# Patient Record
Sex: MALE | Race: BLACK OR AFRICAN AMERICAN | NOT HISPANIC OR LATINO | URBAN - METROPOLITAN AREA
[De-identification: names, ages, dates, MRNs, and addresses within clinical notes are randomized per-mention and may not be internally consistent; named-entity substitution may affect disease eponyms.]

---

## 2021-01-01 ENCOUNTER — INPATIENT (INPATIENT)
Facility: HOSPITAL | Age: 61
LOS: 8 days | End: 2021-10-24
Attending: HOSPITALIST | Admitting: HOSPITALIST
Payer: MEDICAID

## 2021-01-01 VITALS
TEMPERATURE: 99 F | HEART RATE: 120 BPM | RESPIRATION RATE: 16 BRPM | OXYGEN SATURATION: 100 % | DIASTOLIC BLOOD PRESSURE: 153 MMHG | SYSTOLIC BLOOD PRESSURE: 175 MMHG

## 2021-01-01 VITALS
SYSTOLIC BLOOD PRESSURE: 108 MMHG | RESPIRATION RATE: 18 BRPM | HEART RATE: 88 BPM | TEMPERATURE: 98 F | OXYGEN SATURATION: 98 % | DIASTOLIC BLOOD PRESSURE: 57 MMHG

## 2021-01-01 DIAGNOSIS — M79.89 OTHER SPECIFIED SOFT TISSUE DISORDERS: ICD-10-CM

## 2021-01-01 DIAGNOSIS — I10 ESSENTIAL (PRIMARY) HYPERTENSION: ICD-10-CM

## 2021-01-01 DIAGNOSIS — E11.9 TYPE 2 DIABETES MELLITUS WITHOUT COMPLICATIONS: ICD-10-CM

## 2021-01-01 DIAGNOSIS — E87.1 HYPO-OSMOLALITY AND HYPONATREMIA: ICD-10-CM

## 2021-01-01 DIAGNOSIS — Z29.9 ENCOUNTER FOR PROPHYLACTIC MEASURES, UNSPECIFIED: ICD-10-CM

## 2021-01-01 DIAGNOSIS — R41.82 ALTERED MENTAL STATUS, UNSPECIFIED: ICD-10-CM

## 2021-01-01 DIAGNOSIS — M54.9 DORSALGIA, UNSPECIFIED: ICD-10-CM

## 2021-01-01 DIAGNOSIS — F10.10 ALCOHOL ABUSE, UNCOMPLICATED: ICD-10-CM

## 2021-01-01 DIAGNOSIS — R93.89 ABNORMAL FINDINGS ON DIAGNOSTIC IMAGING OF OTHER SPECIFIED BODY STRUCTURES: ICD-10-CM

## 2021-01-01 DIAGNOSIS — D86.9 SARCOIDOSIS, UNSPECIFIED: ICD-10-CM

## 2021-01-01 DIAGNOSIS — F03.90 UNSPECIFIED DEMENTIA, UNSPECIFIED SEVERITY, WITHOUT BEHAVIORAL DISTURBANCE, PSYCHOTIC DISTURBANCE, MOOD DISTURBANCE, AND ANXIETY: ICD-10-CM

## 2021-01-01 DIAGNOSIS — G92.8 OTHER TOXIC ENCEPHALOPATHY: ICD-10-CM

## 2021-01-01 DIAGNOSIS — R50.9 FEVER, UNSPECIFIED: ICD-10-CM

## 2021-01-01 LAB
A1C WITH ESTIMATED AVERAGE GLUCOSE RESULT: 6.3 % — HIGH (ref 4–5.6)
ACE SERPL-CCNC: 28 U/L — SIGNIFICANT CHANGE UP (ref 14–82)
ALBUMIN SERPL ELPH-MCNC: 1.6 G/DL — LOW (ref 3.3–5)
ALBUMIN SERPL ELPH-MCNC: 3.3 G/DL — SIGNIFICANT CHANGE UP (ref 3.3–5)
ALBUMIN SERPL ELPH-MCNC: 3.9 G/DL — SIGNIFICANT CHANGE UP (ref 3.3–5)
ALBUMIN SERPL ELPH-MCNC: 4 G/DL — SIGNIFICANT CHANGE UP (ref 3.3–5)
ALBUMIN SERPL ELPH-MCNC: 4.2 G/DL — SIGNIFICANT CHANGE UP (ref 3.3–5)
ALP SERPL-CCNC: 108 U/L — SIGNIFICANT CHANGE UP (ref 40–120)
ALP SERPL-CCNC: 116 U/L — SIGNIFICANT CHANGE UP (ref 40–120)
ALP SERPL-CCNC: 119 U/L — SIGNIFICANT CHANGE UP (ref 40–120)
ALP SERPL-CCNC: 54 U/L — SIGNIFICANT CHANGE UP (ref 40–120)
ALP SERPL-CCNC: 96 U/L — SIGNIFICANT CHANGE UP (ref 40–120)
ALT FLD-CCNC: 10 U/L — SIGNIFICANT CHANGE UP (ref 4–41)
ALT FLD-CCNC: 11 U/L — SIGNIFICANT CHANGE UP (ref 4–41)
ALT FLD-CCNC: 12 U/L — SIGNIFICANT CHANGE UP (ref 4–41)
ALT FLD-CCNC: 6 U/L — SIGNIFICANT CHANGE UP (ref 4–41)
ALT FLD-CCNC: 9 U/L — SIGNIFICANT CHANGE UP (ref 4–41)
AMPHET UR-MCNC: NEGATIVE — SIGNIFICANT CHANGE UP
ANION GAP SERPL CALC-SCNC: 13 MMOL/L — SIGNIFICANT CHANGE UP (ref 7–14)
ANION GAP SERPL CALC-SCNC: 14 MMOL/L — SIGNIFICANT CHANGE UP (ref 7–14)
ANION GAP SERPL CALC-SCNC: 15 MMOL/L — HIGH (ref 7–14)
ANION GAP SERPL CALC-SCNC: 16 MMOL/L — HIGH (ref 7–14)
ANION GAP SERPL CALC-SCNC: 17 MMOL/L — HIGH (ref 7–14)
ANION GAP SERPL CALC-SCNC: 18 MMOL/L — HIGH (ref 7–14)
ANION GAP SERPL CALC-SCNC: 9 MMOL/L — SIGNIFICANT CHANGE UP (ref 7–14)
APAP SERPL-MCNC: <15 UG/ML — SIGNIFICANT CHANGE UP (ref 15–25)
APPEARANCE CSF: CLEAR — SIGNIFICANT CHANGE UP
APPEARANCE SPUN FLD: ABNORMAL
APPEARANCE UR: CLEAR — SIGNIFICANT CHANGE UP
APPEARANCE UR: CLEAR — SIGNIFICANT CHANGE UP
AST SERPL-CCNC: 17 U/L — SIGNIFICANT CHANGE UP (ref 4–40)
AST SERPL-CCNC: 20 U/L — SIGNIFICANT CHANGE UP (ref 4–40)
AST SERPL-CCNC: 21 U/L — SIGNIFICANT CHANGE UP (ref 4–40)
AST SERPL-CCNC: 22 U/L — SIGNIFICANT CHANGE UP (ref 4–40)
AST SERPL-CCNC: 22 U/L — SIGNIFICANT CHANGE UP (ref 4–40)
B BURGDOR C6 AB SER-ACNC: NEGATIVE — SIGNIFICANT CHANGE UP
B BURGDOR IGG+IGM SER-ACNC: 0.1 INDEX — SIGNIFICANT CHANGE UP (ref 0.01–0.89)
B PERT DNA SPEC QL NAA+PROBE: SIGNIFICANT CHANGE UP
B PERT+PARAPERT DNA PNL SPEC NAA+PROBE: SIGNIFICANT CHANGE UP
BACTERIA # UR AUTO: ABNORMAL
BACTERIA # UR AUTO: NEGATIVE — SIGNIFICANT CHANGE UP
BACTERIAL AG PNL SER: 0 % — SIGNIFICANT CHANGE UP
BARBITURATES UR SCN-MCNC: NEGATIVE — SIGNIFICANT CHANGE UP
BASE EXCESS BLDV CALC-SCNC: 1.4 MMOL/L — SIGNIFICANT CHANGE UP (ref -2–3)
BASOPHILS # BLD AUTO: 0 K/UL — SIGNIFICANT CHANGE UP (ref 0–0.2)
BASOPHILS # BLD AUTO: 0 K/UL — SIGNIFICANT CHANGE UP (ref 0–0.2)
BASOPHILS # BLD AUTO: 0.01 K/UL — SIGNIFICANT CHANGE UP (ref 0–0.2)
BASOPHILS # BLD AUTO: 0.03 K/UL — SIGNIFICANT CHANGE UP (ref 0–0.2)
BASOPHILS NFR BLD AUTO: 0 % — SIGNIFICANT CHANGE UP (ref 0–2)
BASOPHILS NFR BLD AUTO: 0 % — SIGNIFICANT CHANGE UP (ref 0–2)
BASOPHILS NFR BLD AUTO: 0.2 % — SIGNIFICANT CHANGE UP (ref 0–2)
BASOPHILS NFR BLD AUTO: 0.6 % — SIGNIFICANT CHANGE UP (ref 0–2)
BENZODIAZ UR-MCNC: NEGATIVE — SIGNIFICANT CHANGE UP
BILIRUB DIRECT SERPL-MCNC: <0.2 MG/DL — SIGNIFICANT CHANGE UP (ref 0–0.2)
BILIRUB INDIRECT FLD-MCNC: >0 MG/DL — SIGNIFICANT CHANGE UP (ref 0–1)
BILIRUB SERPL-MCNC: 0.2 MG/DL — SIGNIFICANT CHANGE UP (ref 0.2–1.2)
BILIRUB SERPL-MCNC: 0.6 MG/DL — SIGNIFICANT CHANGE UP (ref 0.2–1.2)
BILIRUB SERPL-MCNC: 0.7 MG/DL — SIGNIFICANT CHANGE UP (ref 0.2–1.2)
BILIRUB UR-MCNC: NEGATIVE — SIGNIFICANT CHANGE UP
BILIRUB UR-MCNC: NEGATIVE — SIGNIFICANT CHANGE UP
BLOOD GAS VENOUS COMPREHENSIVE RESULT: SIGNIFICANT CHANGE UP
BORDETELLA PARAPERTUSSIS (RAPRVP): SIGNIFICANT CHANGE UP
BUN SERPL-MCNC: 12 MG/DL — SIGNIFICANT CHANGE UP (ref 7–23)
BUN SERPL-MCNC: 13 MG/DL — SIGNIFICANT CHANGE UP (ref 7–23)
BUN SERPL-MCNC: 14 MG/DL — SIGNIFICANT CHANGE UP (ref 7–23)
BUN SERPL-MCNC: 16 MG/DL — SIGNIFICANT CHANGE UP (ref 7–23)
BUN SERPL-MCNC: 16 MG/DL — SIGNIFICANT CHANGE UP (ref 7–23)
BUN SERPL-MCNC: 18 MG/DL — SIGNIFICANT CHANGE UP (ref 7–23)
BUN SERPL-MCNC: 18 MG/DL — SIGNIFICANT CHANGE UP (ref 7–23)
BUN SERPL-MCNC: 19 MG/DL — SIGNIFICANT CHANGE UP (ref 7–23)
BUN SERPL-MCNC: 20 MG/DL — SIGNIFICANT CHANGE UP (ref 7–23)
BUN SERPL-MCNC: 21 MG/DL — SIGNIFICANT CHANGE UP (ref 7–23)
BUN SERPL-MCNC: 22 MG/DL — SIGNIFICANT CHANGE UP (ref 7–23)
BUN SERPL-MCNC: 22 MG/DL — SIGNIFICANT CHANGE UP (ref 7–23)
BUN SERPL-MCNC: 27 MG/DL — HIGH (ref 7–23)
BUN SERPL-MCNC: 29 MG/DL — HIGH (ref 7–23)
BUN SERPL-MCNC: 33 MG/DL — HIGH (ref 7–23)
C PNEUM DNA SPEC QL NAA+PROBE: SIGNIFICANT CHANGE UP
CALCIUM SERPL-MCNC: 10 MG/DL — SIGNIFICANT CHANGE UP (ref 8.4–10.5)
CALCIUM SERPL-MCNC: 4.9 MG/DL — CRITICAL LOW (ref 8.4–10.5)
CALCIUM SERPL-MCNC: 8.6 MG/DL — SIGNIFICANT CHANGE UP (ref 8.4–10.5)
CALCIUM SERPL-MCNC: 8.7 MG/DL — SIGNIFICANT CHANGE UP (ref 8.4–10.5)
CALCIUM SERPL-MCNC: 8.7 MG/DL — SIGNIFICANT CHANGE UP (ref 8.4–10.5)
CALCIUM SERPL-MCNC: 8.8 MG/DL — SIGNIFICANT CHANGE UP (ref 8.4–10.5)
CALCIUM SERPL-MCNC: 8.9 MG/DL — SIGNIFICANT CHANGE UP (ref 8.4–10.5)
CALCIUM SERPL-MCNC: 9.1 MG/DL — SIGNIFICANT CHANGE UP (ref 8.4–10.5)
CALCIUM SERPL-MCNC: 9.2 MG/DL — SIGNIFICANT CHANGE UP (ref 8.4–10.5)
CALCIUM SERPL-MCNC: 9.3 MG/DL — SIGNIFICANT CHANGE UP (ref 8.4–10.5)
CALCIUM SERPL-MCNC: 9.4 MG/DL — SIGNIFICANT CHANGE UP (ref 8.4–10.5)
CALCIUM SERPL-MCNC: 9.6 MG/DL — SIGNIFICANT CHANGE UP (ref 8.4–10.5)
CALCIUM SERPL-MCNC: 9.6 MG/DL — SIGNIFICANT CHANGE UP (ref 8.4–10.5)
CALCIUM SERPL-MCNC: 9.8 MG/DL — SIGNIFICANT CHANGE UP (ref 8.4–10.5)
CHLORIDE BLDV-SCNC: 95 MMOL/L — LOW (ref 96–108)
CHLORIDE SERPL-SCNC: 115 MMOL/L — HIGH (ref 98–107)
CHLORIDE SERPL-SCNC: 81 MMOL/L — LOW (ref 98–107)
CHLORIDE SERPL-SCNC: 82 MMOL/L — LOW (ref 98–107)
CHLORIDE SERPL-SCNC: 84 MMOL/L — LOW (ref 98–107)
CHLORIDE SERPL-SCNC: 86 MMOL/L — LOW (ref 98–107)
CHLORIDE SERPL-SCNC: 88 MMOL/L — LOW (ref 98–107)
CHLORIDE SERPL-SCNC: 88 MMOL/L — LOW (ref 98–107)
CHLORIDE SERPL-SCNC: 89 MMOL/L — LOW (ref 98–107)
CHLORIDE SERPL-SCNC: 90 MMOL/L — LOW (ref 98–107)
CHLORIDE SERPL-SCNC: 94 MMOL/L — LOW (ref 98–107)
CHLORIDE UR-SCNC: 144 MMOL/L — SIGNIFICANT CHANGE UP
CHLORIDE UR-SCNC: 79 MMOL/L — SIGNIFICANT CHANGE UP
CHLORIDE UR-SCNC: 87 MMOL/L — SIGNIFICANT CHANGE UP
CK SERPL-CCNC: 104 U/L — SIGNIFICANT CHANGE UP (ref 30–200)
CO2 BLDV-SCNC: 27.9 MMOL/L — HIGH (ref 22–26)
CO2 SERPL-SCNC: 11 MMOL/L — LOW (ref 22–31)
CO2 SERPL-SCNC: 15 MMOL/L — LOW (ref 22–31)
CO2 SERPL-SCNC: 21 MMOL/L — LOW (ref 22–31)
CO2 SERPL-SCNC: 22 MMOL/L — SIGNIFICANT CHANGE UP (ref 22–31)
CO2 SERPL-SCNC: 23 MMOL/L — SIGNIFICANT CHANGE UP (ref 22–31)
CO2 SERPL-SCNC: 24 MMOL/L — SIGNIFICANT CHANGE UP (ref 22–31)
CO2 SERPL-SCNC: 24 MMOL/L — SIGNIFICANT CHANGE UP (ref 22–31)
CO2 SERPL-SCNC: 25 MMOL/L — SIGNIFICANT CHANGE UP (ref 22–31)
CO2 SERPL-SCNC: 26 MMOL/L — SIGNIFICANT CHANGE UP (ref 22–31)
CO2 SERPL-SCNC: 27 MMOL/L — SIGNIFICANT CHANGE UP (ref 22–31)
CO2 SERPL-SCNC: 27 MMOL/L — SIGNIFICANT CHANGE UP (ref 22–31)
COCAINE METAB.OTHER UR-MCNC: NEGATIVE — SIGNIFICANT CHANGE UP
COD CRY URNS QL: ABNORMAL
COLOR CSF: YELLOW
COLOR SPEC: YELLOW — SIGNIFICANT CHANGE UP
COLOR SPEC: YELLOW — SIGNIFICANT CHANGE UP
COVID-19 SPIKE DOMAIN AB INTERP: NEGATIVE — SIGNIFICANT CHANGE UP
COVID-19 SPIKE DOMAIN ANTIBODY RESULT: 0.4 U/ML — SIGNIFICANT CHANGE UP
CREAT SERPL-MCNC: 0.38 MG/DL — LOW (ref 0.5–1.3)
CREAT SERPL-MCNC: 0.57 MG/DL — SIGNIFICANT CHANGE UP (ref 0.5–1.3)
CREAT SERPL-MCNC: 0.59 MG/DL — SIGNIFICANT CHANGE UP (ref 0.5–1.3)
CREAT SERPL-MCNC: 0.63 MG/DL — SIGNIFICANT CHANGE UP (ref 0.5–1.3)
CREAT SERPL-MCNC: 0.63 MG/DL — SIGNIFICANT CHANGE UP (ref 0.5–1.3)
CREAT SERPL-MCNC: 0.66 MG/DL — SIGNIFICANT CHANGE UP (ref 0.5–1.3)
CREAT SERPL-MCNC: 0.68 MG/DL — SIGNIFICANT CHANGE UP (ref 0.5–1.3)
CREAT SERPL-MCNC: 0.68 MG/DL — SIGNIFICANT CHANGE UP (ref 0.5–1.3)
CREAT SERPL-MCNC: 0.7 MG/DL — SIGNIFICANT CHANGE UP (ref 0.5–1.3)
CREAT SERPL-MCNC: 0.72 MG/DL — SIGNIFICANT CHANGE UP (ref 0.5–1.3)
CREAT SERPL-MCNC: 0.74 MG/DL — SIGNIFICANT CHANGE UP (ref 0.5–1.3)
CREAT SERPL-MCNC: 0.76 MG/DL — SIGNIFICANT CHANGE UP (ref 0.5–1.3)
CREAT SERPL-MCNC: 0.77 MG/DL — SIGNIFICANT CHANGE UP (ref 0.5–1.3)
CREAT SERPL-MCNC: 0.8 MG/DL — SIGNIFICANT CHANGE UP (ref 0.5–1.3)
CREAT SERPL-MCNC: 0.86 MG/DL — SIGNIFICANT CHANGE UP (ref 0.5–1.3)
CREAT SERPL-MCNC: 1.2 MG/DL — SIGNIFICANT CHANGE UP (ref 0.5–1.3)
CREATININE URINE RESULT, DAU: 314 MG/DL — SIGNIFICANT CHANGE UP
CRP SERPL-MCNC: 207.4 MG/L — HIGH
CRYPTOC AG CSF-ACNC: NEGATIVE — SIGNIFICANT CHANGE UP
CSF PCR RESULT: SIGNIFICANT CHANGE UP
CULTURE RESULTS: NO GROWTH — SIGNIFICANT CHANGE UP
CULTURE RESULTS: SIGNIFICANT CHANGE UP
DIFF PNL FLD: ABNORMAL
DIFF PNL FLD: ABNORMAL
EOSINOPHIL # BLD AUTO: 0 K/UL — SIGNIFICANT CHANGE UP (ref 0–0.5)
EOSINOPHIL # CSF: 0 % — SIGNIFICANT CHANGE UP
EOSINOPHIL NFR BLD AUTO: 0 % — SIGNIFICANT CHANGE UP (ref 0–6)
EPI CELLS # UR: 1 /HPF — SIGNIFICANT CHANGE UP (ref 0–5)
EPI CELLS # UR: 1 /HPF — SIGNIFICANT CHANGE UP (ref 0–5)
ERYTHROCYTE [SEDIMENTATION RATE] IN BLOOD: 53 MM/HR — HIGH (ref 1–15)
ESTIMATED AVERAGE GLUCOSE: 134 — SIGNIFICANT CHANGE UP
ETHANOL SERPL-MCNC: <10 MG/DL — SIGNIFICANT CHANGE UP
EV RNA SPEC QL NAA+PROBE: NEGATIVE — SIGNIFICANT CHANGE UP
FLUAV SUBTYP SPEC NAA+PROBE: SIGNIFICANT CHANGE UP
FLUBV RNA SPEC QL NAA+PROBE: SIGNIFICANT CHANGE UP
FOLATE SERPL-MCNC: 5.6 NG/ML — SIGNIFICANT CHANGE UP (ref 3.1–17.5)
FUNGITELL: <31 PG/ML — SIGNIFICANT CHANGE UP
GAS PNL BLDV: 126 MMOL/L — LOW (ref 136–145)
GLUCOSE BLDC GLUCOMTR-MCNC: 100 MG/DL — HIGH (ref 70–99)
GLUCOSE BLDC GLUCOMTR-MCNC: 101 MG/DL — HIGH (ref 70–99)
GLUCOSE BLDC GLUCOMTR-MCNC: 102 MG/DL — HIGH (ref 70–99)
GLUCOSE BLDC GLUCOMTR-MCNC: 102 MG/DL — HIGH (ref 70–99)
GLUCOSE BLDC GLUCOMTR-MCNC: 103 MG/DL — HIGH (ref 70–99)
GLUCOSE BLDC GLUCOMTR-MCNC: 103 MG/DL — HIGH (ref 70–99)
GLUCOSE BLDC GLUCOMTR-MCNC: 104 MG/DL — HIGH (ref 70–99)
GLUCOSE BLDC GLUCOMTR-MCNC: 105 MG/DL — HIGH (ref 70–99)
GLUCOSE BLDC GLUCOMTR-MCNC: 105 MG/DL — HIGH (ref 70–99)
GLUCOSE BLDC GLUCOMTR-MCNC: 109 MG/DL — HIGH (ref 70–99)
GLUCOSE BLDC GLUCOMTR-MCNC: 114 MG/DL — HIGH (ref 70–99)
GLUCOSE BLDC GLUCOMTR-MCNC: 115 MG/DL — HIGH (ref 70–99)
GLUCOSE BLDC GLUCOMTR-MCNC: 116 MG/DL — HIGH (ref 70–99)
GLUCOSE BLDC GLUCOMTR-MCNC: 118 MG/DL — HIGH (ref 70–99)
GLUCOSE BLDC GLUCOMTR-MCNC: 120 MG/DL — HIGH (ref 70–99)
GLUCOSE BLDC GLUCOMTR-MCNC: 121 MG/DL — HIGH (ref 70–99)
GLUCOSE BLDC GLUCOMTR-MCNC: 122 MG/DL — HIGH (ref 70–99)
GLUCOSE BLDC GLUCOMTR-MCNC: 122 MG/DL — HIGH (ref 70–99)
GLUCOSE BLDC GLUCOMTR-MCNC: 124 MG/DL — HIGH (ref 70–99)
GLUCOSE BLDC GLUCOMTR-MCNC: 126 MG/DL — HIGH (ref 70–99)
GLUCOSE BLDC GLUCOMTR-MCNC: 126 MG/DL — HIGH (ref 70–99)
GLUCOSE BLDC GLUCOMTR-MCNC: 131 MG/DL — HIGH (ref 70–99)
GLUCOSE BLDC GLUCOMTR-MCNC: 132 MG/DL — HIGH (ref 70–99)
GLUCOSE BLDC GLUCOMTR-MCNC: 133 MG/DL — HIGH (ref 70–99)
GLUCOSE BLDC GLUCOMTR-MCNC: 134 MG/DL — HIGH (ref 70–99)
GLUCOSE BLDC GLUCOMTR-MCNC: 145 MG/DL — HIGH (ref 70–99)
GLUCOSE BLDC GLUCOMTR-MCNC: 43 MG/DL — CRITICAL LOW (ref 70–99)
GLUCOSE BLDC GLUCOMTR-MCNC: 84 MG/DL — SIGNIFICANT CHANGE UP (ref 70–99)
GLUCOSE BLDC GLUCOMTR-MCNC: 89 MG/DL — SIGNIFICANT CHANGE UP (ref 70–99)
GLUCOSE BLDC GLUCOMTR-MCNC: 91 MG/DL — SIGNIFICANT CHANGE UP (ref 70–99)
GLUCOSE BLDC GLUCOMTR-MCNC: 92 MG/DL — SIGNIFICANT CHANGE UP (ref 70–99)
GLUCOSE BLDC GLUCOMTR-MCNC: 93 MG/DL — SIGNIFICANT CHANGE UP (ref 70–99)
GLUCOSE BLDC GLUCOMTR-MCNC: 96 MG/DL — SIGNIFICANT CHANGE UP (ref 70–99)
GLUCOSE BLDC GLUCOMTR-MCNC: 99 MG/DL — SIGNIFICANT CHANGE UP (ref 70–99)
GLUCOSE BLDV-MCNC: 119 MG/DL — HIGH (ref 70–99)
GLUCOSE CSF-MCNC: 14 MG/DL — LOW (ref 40–70)
GLUCOSE SERPL-MCNC: 102 MG/DL — HIGH (ref 70–99)
GLUCOSE SERPL-MCNC: 103 MG/DL — HIGH (ref 70–99)
GLUCOSE SERPL-MCNC: 103 MG/DL — HIGH (ref 70–99)
GLUCOSE SERPL-MCNC: 105 MG/DL — HIGH (ref 70–99)
GLUCOSE SERPL-MCNC: 106 MG/DL — HIGH (ref 70–99)
GLUCOSE SERPL-MCNC: 110 MG/DL — HIGH (ref 70–99)
GLUCOSE SERPL-MCNC: 119 MG/DL — HIGH (ref 70–99)
GLUCOSE SERPL-MCNC: 120 MG/DL — HIGH (ref 70–99)
GLUCOSE SERPL-MCNC: 124 MG/DL — HIGH (ref 70–99)
GLUCOSE SERPL-MCNC: 68 MG/DL — LOW (ref 70–99)
GLUCOSE SERPL-MCNC: 92 MG/DL — SIGNIFICANT CHANGE UP (ref 70–99)
GLUCOSE SERPL-MCNC: 93 MG/DL — SIGNIFICANT CHANGE UP (ref 70–99)
GLUCOSE SERPL-MCNC: 94 MG/DL — SIGNIFICANT CHANGE UP (ref 70–99)
GLUCOSE SERPL-MCNC: 96 MG/DL — SIGNIFICANT CHANGE UP (ref 70–99)
GLUCOSE SERPL-MCNC: 96 MG/DL — SIGNIFICANT CHANGE UP (ref 70–99)
GLUCOSE SERPL-MCNC: 97 MG/DL — SIGNIFICANT CHANGE UP (ref 70–99)
GLUCOSE UR QL: NEGATIVE — SIGNIFICANT CHANGE UP
GLUCOSE UR QL: NEGATIVE — SIGNIFICANT CHANGE UP
GRAM STN FLD: SIGNIFICANT CHANGE UP
H CAPSUL AG SPEC-ACNC: SIGNIFICANT CHANGE UP
H CAPSUL AG UR IA-ACNC: SIGNIFICANT CHANGE UP NG/ML
H CAPSUL AG UR QL IA: SIGNIFICANT CHANGE UP
HADV DNA SPEC QL NAA+PROBE: SIGNIFICANT CHANGE UP
HCO3 BLDV-SCNC: 27 MMOL/L — SIGNIFICANT CHANGE UP (ref 22–29)
HCOV 229E RNA SPEC QL NAA+PROBE: SIGNIFICANT CHANGE UP
HCOV HKU1 RNA SPEC QL NAA+PROBE: SIGNIFICANT CHANGE UP
HCOV NL63 RNA SPEC QL NAA+PROBE: SIGNIFICANT CHANGE UP
HCOV OC43 RNA SPEC QL NAA+PROBE: SIGNIFICANT CHANGE UP
HCT VFR BLD CALC: 39.4 % — SIGNIFICANT CHANGE UP (ref 39–50)
HCT VFR BLD CALC: 40 % — SIGNIFICANT CHANGE UP (ref 39–50)
HCT VFR BLD CALC: 40.1 % — SIGNIFICANT CHANGE UP (ref 39–50)
HCT VFR BLD CALC: 43.1 % — SIGNIFICANT CHANGE UP (ref 39–50)
HCT VFR BLD CALC: 43.6 % — SIGNIFICANT CHANGE UP (ref 39–50)
HCT VFR BLD CALC: 44.9 % — SIGNIFICANT CHANGE UP (ref 39–50)
HCT VFR BLD CALC: 46.1 % — SIGNIFICANT CHANGE UP (ref 39–50)
HCT VFR BLD CALC: 47.9 % — SIGNIFICANT CHANGE UP (ref 39–50)
HCT VFR BLD CALC: 49.6 % — SIGNIFICANT CHANGE UP (ref 39–50)
HCT VFR BLDA CALC: 45 % — SIGNIFICANT CHANGE UP (ref 39–51)
HCV AB S/CO SERPL IA: 0.3 S/CO — SIGNIFICANT CHANGE UP (ref 0–0.99)
HCV AB SERPL-IMP: SIGNIFICANT CHANGE UP
HGB BLD CALC-MCNC: 15.1 G/DL — SIGNIFICANT CHANGE UP (ref 13–17)
HGB BLD-MCNC: 13.7 G/DL — SIGNIFICANT CHANGE UP (ref 13–17)
HGB BLD-MCNC: 14 G/DL — SIGNIFICANT CHANGE UP (ref 13–17)
HGB BLD-MCNC: 14.1 G/DL — SIGNIFICANT CHANGE UP (ref 13–17)
HGB BLD-MCNC: 14.5 G/DL — SIGNIFICANT CHANGE UP (ref 13–17)
HGB BLD-MCNC: 15.2 G/DL — SIGNIFICANT CHANGE UP (ref 13–17)
HGB BLD-MCNC: 15.7 G/DL — SIGNIFICANT CHANGE UP (ref 13–17)
HGB BLD-MCNC: 16.1 G/DL — SIGNIFICANT CHANGE UP (ref 13–17)
HGB BLD-MCNC: 16.2 G/DL — SIGNIFICANT CHANGE UP (ref 13–17)
HGB BLD-MCNC: 17 G/DL — SIGNIFICANT CHANGE UP (ref 13–17)
HIV 1+2 AB+HIV1 P24 AG SERPL QL IA: SIGNIFICANT CHANGE UP
HMPV RNA SPEC QL NAA+PROBE: SIGNIFICANT CHANGE UP
HPIV1 RNA SPEC QL NAA+PROBE: SIGNIFICANT CHANGE UP
HPIV2 RNA SPEC QL NAA+PROBE: SIGNIFICANT CHANGE UP
HPIV3 RNA SPEC QL NAA+PROBE: SIGNIFICANT CHANGE UP
HPIV4 RNA SPEC QL NAA+PROBE: SIGNIFICANT CHANGE UP
HSV1 IGG SER-ACNC: 44.7 INDEX — HIGH
HSV1 IGG SERPL QL IA: POSITIVE
HSV2 IGG FLD-ACNC: 0.74 INDEX — SIGNIFICANT CHANGE UP
HSV2 IGG SERPL QL IA: NEGATIVE — SIGNIFICANT CHANGE UP
HYALINE CASTS # UR AUTO: 0 /LPF — SIGNIFICANT CHANGE UP (ref 0–7)
HYALINE CASTS # UR AUTO: SIGNIFICANT CHANGE UP /LPF (ref 0–7)
IANC: 3.39 K/UL — SIGNIFICANT CHANGE UP (ref 1.5–8.5)
IANC: 3.99 K/UL — SIGNIFICANT CHANGE UP (ref 1.5–8.5)
IANC: 4.03 K/UL — SIGNIFICANT CHANGE UP (ref 1.5–8.5)
IANC: 4.5 K/UL — SIGNIFICANT CHANGE UP (ref 1.5–8.5)
IANC: 4.92 K/UL — SIGNIFICANT CHANGE UP (ref 1.5–8.5)
IANC: 5.09 K/UL — SIGNIFICANT CHANGE UP (ref 1.5–8.5)
IMM GRANULOCYTES NFR BLD AUTO: 0.2 % — SIGNIFICANT CHANGE UP (ref 0–1.5)
IMM GRANULOCYTES NFR BLD AUTO: 0.4 % — SIGNIFICANT CHANGE UP (ref 0–1.5)
IMM GRANULOCYTES NFR BLD AUTO: 0.5 % — SIGNIFICANT CHANGE UP (ref 0–1.5)
INR BLD: 1.13 RATIO — SIGNIFICANT CHANGE UP (ref 0.88–1.16)
INR BLD: 1.28 RATIO — HIGH (ref 0.88–1.16)
KETONES UR-MCNC: ABNORMAL
KETONES UR-MCNC: ABNORMAL
LABORATORY COMMENT REPORT: SIGNIFICANT CHANGE UP
LACTATE BLDV-MCNC: 2.4 MMOL/L — HIGH (ref 0.5–2)
LDH CSF L TO P-CCNC: 576 U/L — SIGNIFICANT CHANGE UP
LDH FLD-CCNC: 576 U/L — SIGNIFICANT CHANGE UP
LEUKOCYTE ESTERASE UR-ACNC: NEGATIVE — SIGNIFICANT CHANGE UP
LEUKOCYTE ESTERASE UR-ACNC: NEGATIVE — SIGNIFICANT CHANGE UP
LYMPHOCYTES # BLD AUTO: 0.17 K/UL — LOW (ref 1–3.3)
LYMPHOCYTES # BLD AUTO: 0.19 K/UL — LOW (ref 1–3.3)
LYMPHOCYTES # BLD AUTO: 0.25 K/UL — LOW (ref 1–3.3)
LYMPHOCYTES # BLD AUTO: 0.27 K/UL — LOW (ref 1–3.3)
LYMPHOCYTES # BLD AUTO: 0.29 K/UL — LOW (ref 1–3.3)
LYMPHOCYTES # BLD AUTO: 0.4 K/UL — LOW (ref 1–3.3)
LYMPHOCYTES # BLD AUTO: 3 % — LOW (ref 13–44)
LYMPHOCYTES # BLD AUTO: 4.5 % — LOW (ref 13–44)
LYMPHOCYTES # BLD AUTO: 4.7 % — LOW (ref 13–44)
LYMPHOCYTES # BLD AUTO: 4.8 % — LOW (ref 13–44)
LYMPHOCYTES # BLD AUTO: 6 % — LOW (ref 13–44)
LYMPHOCYTES # BLD AUTO: 7.6 % — LOW (ref 13–44)
LYMPHOCYTES # CSF: 79 % — SIGNIFICANT CHANGE UP
M PNEUMO DNA SPEC QL NAA+PROBE: SIGNIFICANT CHANGE UP
MAGNESIUM SERPL-MCNC: 1.2 MG/DL — LOW (ref 1.6–2.6)
MAGNESIUM SERPL-MCNC: 1.7 MG/DL — SIGNIFICANT CHANGE UP (ref 1.6–2.6)
MAGNESIUM SERPL-MCNC: 1.8 MG/DL — SIGNIFICANT CHANGE UP (ref 1.6–2.6)
MAGNESIUM SERPL-MCNC: 1.8 MG/DL — SIGNIFICANT CHANGE UP (ref 1.6–2.6)
MAGNESIUM SERPL-MCNC: 1.9 MG/DL — SIGNIFICANT CHANGE UP (ref 1.6–2.6)
MAGNESIUM SERPL-MCNC: 1.9 MG/DL — SIGNIFICANT CHANGE UP (ref 1.6–2.6)
MAGNESIUM SERPL-MCNC: 2 MG/DL — SIGNIFICANT CHANGE UP (ref 1.6–2.6)
MAGNESIUM SERPL-MCNC: 2.1 MG/DL — SIGNIFICANT CHANGE UP (ref 1.6–2.6)
MCHC RBC-ENTMCNC: 26.4 PG — LOW (ref 27–34)
MCHC RBC-ENTMCNC: 26.6 PG — LOW (ref 27–34)
MCHC RBC-ENTMCNC: 26.7 PG — LOW (ref 27–34)
MCHC RBC-ENTMCNC: 27 PG — SIGNIFICANT CHANGE UP (ref 27–34)
MCHC RBC-ENTMCNC: 27.1 PG — SIGNIFICANT CHANGE UP (ref 27–34)
MCHC RBC-ENTMCNC: 27.2 PG — SIGNIFICANT CHANGE UP (ref 27–34)
MCHC RBC-ENTMCNC: 27.3 PG — SIGNIFICANT CHANGE UP (ref 27–34)
MCHC RBC-ENTMCNC: 33.6 GM/DL — SIGNIFICANT CHANGE UP (ref 32–36)
MCHC RBC-ENTMCNC: 33.8 GM/DL — SIGNIFICANT CHANGE UP (ref 32–36)
MCHC RBC-ENTMCNC: 34.3 GM/DL — SIGNIFICANT CHANGE UP (ref 32–36)
MCHC RBC-ENTMCNC: 34.8 GM/DL — SIGNIFICANT CHANGE UP (ref 32–36)
MCHC RBC-ENTMCNC: 34.9 GM/DL — SIGNIFICANT CHANGE UP (ref 32–36)
MCHC RBC-ENTMCNC: 34.9 GM/DL — SIGNIFICANT CHANGE UP (ref 32–36)
MCHC RBC-ENTMCNC: 35 GM/DL — SIGNIFICANT CHANGE UP (ref 32–36)
MCHC RBC-ENTMCNC: 35 GM/DL — SIGNIFICANT CHANGE UP (ref 32–36)
MCHC RBC-ENTMCNC: 35.2 GM/DL — SIGNIFICANT CHANGE UP (ref 32–36)
MCV RBC AUTO: 76.1 FL — LOW (ref 80–100)
MCV RBC AUTO: 76.2 FL — LOW (ref 80–100)
MCV RBC AUTO: 76.8 FL — LOW (ref 80–100)
MCV RBC AUTO: 76.8 FL — LOW (ref 80–100)
MCV RBC AUTO: 77.7 FL — LOW (ref 80–100)
MCV RBC AUTO: 77.9 FL — LOW (ref 80–100)
MCV RBC AUTO: 78 FL — LOW (ref 80–100)
MCV RBC AUTO: 78.4 FL — LOW (ref 80–100)
MCV RBC AUTO: 80.8 FL — SIGNIFICANT CHANGE UP (ref 80–100)
METHADONE UR-MCNC: NEGATIVE — SIGNIFICANT CHANGE UP
MONOCYTES # BLD AUTO: 0.33 K/UL — SIGNIFICANT CHANGE UP (ref 0–0.9)
MONOCYTES # BLD AUTO: 0.43 K/UL — SIGNIFICANT CHANGE UP (ref 0–0.9)
MONOCYTES # BLD AUTO: 0.45 K/UL — SIGNIFICANT CHANGE UP (ref 0–0.9)
MONOCYTES # BLD AUTO: 0.5 K/UL — SIGNIFICANT CHANGE UP (ref 0–0.9)
MONOCYTES # BLD AUTO: 0.53 K/UL — SIGNIFICANT CHANGE UP (ref 0–0.9)
MONOCYTES # BLD AUTO: 0.82 K/UL — SIGNIFICANT CHANGE UP (ref 0–0.9)
MONOCYTES NFR BLD AUTO: 10 % — SIGNIFICANT CHANGE UP (ref 2–14)
MONOCYTES NFR BLD AUTO: 10.3 % — SIGNIFICANT CHANGE UP (ref 2–14)
MONOCYTES NFR BLD AUTO: 15.6 % — HIGH (ref 2–14)
MONOCYTES NFR BLD AUTO: 7.5 % — SIGNIFICANT CHANGE UP (ref 2–14)
MONOCYTES NFR BLD AUTO: 8 % — SIGNIFICANT CHANGE UP (ref 2–14)
MONOCYTES NFR BLD AUTO: 8 % — SIGNIFICANT CHANGE UP (ref 2–14)
MONOS+MACROS NFR CSF: 10 % — SIGNIFICANT CHANGE UP
NEUTROPHILS # BLD AUTO: 3.4 K/UL — SIGNIFICANT CHANGE UP (ref 1.8–7.4)
NEUTROPHILS # BLD AUTO: 3.99 K/UL — SIGNIFICANT CHANGE UP (ref 1.8–7.4)
NEUTROPHILS # BLD AUTO: 4.03 K/UL — SIGNIFICANT CHANGE UP (ref 1.8–7.4)
NEUTROPHILS # BLD AUTO: 4.5 K/UL — SIGNIFICANT CHANGE UP (ref 1.8–7.4)
NEUTROPHILS # BLD AUTO: 4.92 K/UL — SIGNIFICANT CHANGE UP (ref 1.8–7.4)
NEUTROPHILS # BLD AUTO: 5.09 K/UL — SIGNIFICANT CHANGE UP (ref 1.8–7.4)
NEUTROPHILS # CSF: 11 % — SIGNIFICANT CHANGE UP
NEUTROPHILS NFR BLD AUTO: 75.8 % — SIGNIFICANT CHANGE UP (ref 43–77)
NEUTROPHILS NFR BLD AUTO: 79.5 % — HIGH (ref 43–77)
NEUTROPHILS NFR BLD AUTO: 83.1 % — HIGH (ref 43–77)
NEUTROPHILS NFR BLD AUTO: 84.9 % — HIGH (ref 43–77)
NEUTROPHILS NFR BLD AUTO: 86.8 % — HIGH (ref 43–77)
NEUTROPHILS NFR BLD AUTO: 88.8 % — HIGH (ref 43–77)
NIGHT BLUE STAIN TISS: SIGNIFICANT CHANGE UP
NITRITE UR-MCNC: NEGATIVE — SIGNIFICANT CHANGE UP
NITRITE UR-MCNC: NEGATIVE — SIGNIFICANT CHANGE UP
NRBC # BLD: 0 /100 WBCS — SIGNIFICANT CHANGE UP
NRBC # FLD: 0 K/UL — SIGNIFICANT CHANGE UP
NRBC NFR CSF: 31 CELLS/UL — HIGH (ref 0–5)
OPIATES UR-MCNC: NEGATIVE — SIGNIFICANT CHANGE UP
OSMOLALITY SERPL: 260 MOSMOL/KG — LOW (ref 280–301)
OSMOLALITY UR: 757 MOSM/KG — SIGNIFICANT CHANGE UP (ref 50–1200)
OSMOLALITY UR: 805 MOSM/KG — SIGNIFICANT CHANGE UP (ref 50–1200)
OTHER CELLS CSF MANUAL: 0 % — SIGNIFICANT CHANGE UP
OXYCODONE UR-MCNC: NEGATIVE — SIGNIFICANT CHANGE UP
PCO2 BLDV: 43 MMHG — SIGNIFICANT CHANGE UP (ref 42–55)
PCP SPEC-MCNC: SIGNIFICANT CHANGE UP
PCP UR-MCNC: NEGATIVE — SIGNIFICANT CHANGE UP
PH BLDV: 7.4 — SIGNIFICANT CHANGE UP (ref 7.32–7.43)
PH UR: 6 — SIGNIFICANT CHANGE UP (ref 5–8)
PH UR: 6.5 — SIGNIFICANT CHANGE UP (ref 5–8)
PHOSPHATE SERPL-MCNC: 1.7 MG/DL — LOW (ref 2.5–4.5)
PHOSPHATE SERPL-MCNC: 1.7 MG/DL — LOW (ref 2.5–4.5)
PHOSPHATE SERPL-MCNC: 2.1 MG/DL — LOW (ref 2.5–4.5)
PHOSPHATE SERPL-MCNC: 2.3 MG/DL — LOW (ref 2.5–4.5)
PHOSPHATE SERPL-MCNC: 2.3 MG/DL — LOW (ref 2.5–4.5)
PHOSPHATE SERPL-MCNC: 2.4 MG/DL — LOW (ref 2.5–4.5)
PHOSPHATE SERPL-MCNC: 2.4 MG/DL — LOW (ref 2.5–4.5)
PHOSPHATE SERPL-MCNC: 2.6 MG/DL — SIGNIFICANT CHANGE UP (ref 2.5–4.5)
PHOSPHATE SERPL-MCNC: 2.7 MG/DL — SIGNIFICANT CHANGE UP (ref 2.5–4.5)
PHOSPHATE SERPL-MCNC: 2.9 MG/DL — SIGNIFICANT CHANGE UP (ref 2.5–4.5)
PHOSPHATE SERPL-MCNC: 2.9 MG/DL — SIGNIFICANT CHANGE UP (ref 2.5–4.5)
PLATELET # BLD AUTO: 253 K/UL — SIGNIFICANT CHANGE UP (ref 150–400)
PLATELET # BLD AUTO: 264 K/UL — SIGNIFICANT CHANGE UP (ref 150–400)
PLATELET # BLD AUTO: 268 K/UL — SIGNIFICANT CHANGE UP (ref 150–400)
PLATELET # BLD AUTO: 287 K/UL — SIGNIFICANT CHANGE UP (ref 150–400)
PLATELET # BLD AUTO: 292 K/UL — SIGNIFICANT CHANGE UP (ref 150–400)
PLATELET # BLD AUTO: 298 K/UL — SIGNIFICANT CHANGE UP (ref 150–400)
PLATELET # BLD AUTO: 301 K/UL — SIGNIFICANT CHANGE UP (ref 150–400)
PLATELET # BLD AUTO: 310 K/UL — SIGNIFICANT CHANGE UP (ref 150–400)
PLATELET # BLD AUTO: 355 K/UL — SIGNIFICANT CHANGE UP (ref 150–400)
PO2 BLDV: 40 MMHG — SIGNIFICANT CHANGE UP
POTASSIUM BLDV-SCNC: 4.8 MMOL/L — SIGNIFICANT CHANGE UP (ref 3.5–5.1)
POTASSIUM SERPL-MCNC: 3.5 MMOL/L — SIGNIFICANT CHANGE UP (ref 3.5–5.3)
POTASSIUM SERPL-MCNC: 3.7 MMOL/L — SIGNIFICANT CHANGE UP (ref 3.5–5.3)
POTASSIUM SERPL-MCNC: 3.9 MMOL/L — SIGNIFICANT CHANGE UP (ref 3.5–5.3)
POTASSIUM SERPL-MCNC: 4 MMOL/L — SIGNIFICANT CHANGE UP (ref 3.5–5.3)
POTASSIUM SERPL-MCNC: 4.1 MMOL/L — SIGNIFICANT CHANGE UP (ref 3.5–5.3)
POTASSIUM SERPL-MCNC: 4.2 MMOL/L — SIGNIFICANT CHANGE UP (ref 3.5–5.3)
POTASSIUM SERPL-MCNC: 4.5 MMOL/L — SIGNIFICANT CHANGE UP (ref 3.5–5.3)
POTASSIUM SERPL-MCNC: 4.7 MMOL/L — SIGNIFICANT CHANGE UP (ref 3.5–5.3)
POTASSIUM SERPL-MCNC: 5.4 MMOL/L — HIGH (ref 3.5–5.3)
POTASSIUM SERPL-MCNC: 5.6 MMOL/L — HIGH (ref 3.5–5.3)
POTASSIUM SERPL-SCNC: 3.5 MMOL/L — SIGNIFICANT CHANGE UP (ref 3.5–5.3)
POTASSIUM SERPL-SCNC: 3.7 MMOL/L — SIGNIFICANT CHANGE UP (ref 3.5–5.3)
POTASSIUM SERPL-SCNC: 3.9 MMOL/L — SIGNIFICANT CHANGE UP (ref 3.5–5.3)
POTASSIUM SERPL-SCNC: 4 MMOL/L — SIGNIFICANT CHANGE UP (ref 3.5–5.3)
POTASSIUM SERPL-SCNC: 4.1 MMOL/L — SIGNIFICANT CHANGE UP (ref 3.5–5.3)
POTASSIUM SERPL-SCNC: 4.2 MMOL/L — SIGNIFICANT CHANGE UP (ref 3.5–5.3)
POTASSIUM SERPL-SCNC: 4.5 MMOL/L — SIGNIFICANT CHANGE UP (ref 3.5–5.3)
POTASSIUM SERPL-SCNC: 4.7 MMOL/L — SIGNIFICANT CHANGE UP (ref 3.5–5.3)
POTASSIUM SERPL-SCNC: 5.4 MMOL/L — HIGH (ref 3.5–5.3)
POTASSIUM SERPL-SCNC: 5.6 MMOL/L — HIGH (ref 3.5–5.3)
POTASSIUM UR-SCNC: 89.1 MMOL/L — SIGNIFICANT CHANGE UP
PROCALCITONIN SERPL-MCNC: 0.24 NG/ML — HIGH (ref 0.02–0.1)
PROT CSF-MCNC: >200 MG/DL — HIGH (ref 15–45)
PROT SERPL-MCNC: 4.3 G/DL — LOW (ref 6–8.3)
PROT SERPL-MCNC: 6.8 G/DL — SIGNIFICANT CHANGE UP (ref 6–8.3)
PROT SERPL-MCNC: 8 G/DL — SIGNIFICANT CHANGE UP (ref 6–8.3)
PROT SERPL-MCNC: 8.1 G/DL — SIGNIFICANT CHANGE UP (ref 6–8.3)
PROT SERPL-MCNC: 8.2 G/DL — SIGNIFICANT CHANGE UP (ref 6–8.3)
PROT UR-MCNC: ABNORMAL
PROT UR-MCNC: ABNORMAL
PROTHROM AB SERPL-ACNC: 12.8 SEC — SIGNIFICANT CHANGE UP (ref 10.6–13.6)
PROTHROM AB SERPL-ACNC: 14.4 SEC — HIGH (ref 10.6–13.6)
RAPID RVP RESULT: SIGNIFICANT CHANGE UP
RBC # BLD: 5.13 M/UL — SIGNIFICANT CHANGE UP (ref 4.2–5.8)
RBC # BLD: 5.22 M/UL — SIGNIFICANT CHANGE UP (ref 4.2–5.8)
RBC # BLD: 5.25 M/UL — SIGNIFICANT CHANGE UP (ref 4.2–5.8)
RBC # BLD: 5.5 M/UL — SIGNIFICANT CHANGE UP (ref 4.2–5.8)
RBC # BLD: 5.61 M/UL — SIGNIFICANT CHANGE UP (ref 4.2–5.8)
RBC # BLD: 5.9 M/UL — HIGH (ref 4.2–5.8)
RBC # BLD: 5.91 M/UL — HIGH (ref 4.2–5.8)
RBC # BLD: 5.93 M/UL — HIGH (ref 4.2–5.8)
RBC # BLD: 6.37 M/UL — HIGH (ref 4.2–5.8)
RBC # CSF: 3 CELLS/UL — HIGH (ref 0–0)
RBC # FLD: 14.4 % — SIGNIFICANT CHANGE UP (ref 10.3–14.5)
RBC # FLD: 14.5 % — SIGNIFICANT CHANGE UP (ref 10.3–14.5)
RBC # FLD: 14.9 % — HIGH (ref 10.3–14.5)
RBC # FLD: 15 % — HIGH (ref 10.3–14.5)
RBC # FLD: 15.1 % — HIGH (ref 10.3–14.5)
RBC # FLD: 15.5 % — HIGH (ref 10.3–14.5)
RBC # FLD: 15.8 % — HIGH (ref 10.3–14.5)
RBC CASTS # UR COMP ASSIST: 5 /HPF — HIGH (ref 0–4)
RBC CASTS # UR COMP ASSIST: 5 /HPF — HIGH (ref 0–4)
RHEUMATOID FACT SERPL-ACNC: <10 IU/ML — SIGNIFICANT CHANGE UP (ref 0–13)
RSV RNA SPEC QL NAA+PROBE: SIGNIFICANT CHANGE UP
RV+EV RNA SPEC QL NAA+PROBE: SIGNIFICANT CHANGE UP
SALICYLATES SERPL-MCNC: <5 MG/DL — LOW (ref 15–30)
SAO2 % BLDV: 64.6 % — SIGNIFICANT CHANGE UP
SARS-COV-2 IGG+IGM SERPL QL IA: 0.4 U/ML — SIGNIFICANT CHANGE UP
SARS-COV-2 IGG+IGM SERPL QL IA: NEGATIVE — SIGNIFICANT CHANGE UP
SARS-COV-2 RNA SPEC QL NAA+PROBE: SIGNIFICANT CHANGE UP
SODIUM SERPL-SCNC: 120 MMOL/L — CRITICAL LOW (ref 135–145)
SODIUM SERPL-SCNC: 121 MMOL/L — LOW (ref 135–145)
SODIUM SERPL-SCNC: 122 MMOL/L — LOW (ref 135–145)
SODIUM SERPL-SCNC: 123 MMOL/L — LOW (ref 135–145)
SODIUM SERPL-SCNC: 124 MMOL/L — LOW (ref 135–145)
SODIUM SERPL-SCNC: 124 MMOL/L — LOW (ref 135–145)
SODIUM SERPL-SCNC: 125 MMOL/L — LOW (ref 135–145)
SODIUM SERPL-SCNC: 127 MMOL/L — LOW (ref 135–145)
SODIUM SERPL-SCNC: 127 MMOL/L — LOW (ref 135–145)
SODIUM SERPL-SCNC: 128 MMOL/L — LOW (ref 135–145)
SODIUM SERPL-SCNC: 128 MMOL/L — LOW (ref 135–145)
SODIUM SERPL-SCNC: 129 MMOL/L — LOW (ref 135–145)
SODIUM SERPL-SCNC: 135 MMOL/L — SIGNIFICANT CHANGE UP (ref 135–145)
SODIUM SERPL-SCNC: 135 MMOL/L — SIGNIFICANT CHANGE UP (ref 135–145)
SODIUM UR-SCNC: 130 MMOL/L — SIGNIFICANT CHANGE UP
SODIUM UR-SCNC: 85 MMOL/L — SIGNIFICANT CHANGE UP
SODIUM UR-SCNC: 88 MMOL/L — SIGNIFICANT CHANGE UP
SOURCE HSV 1/2: SIGNIFICANT CHANGE UP
SP GR SPEC: 1.02 — SIGNIFICANT CHANGE UP (ref 1–1.05)
SP GR SPEC: 1.03 — SIGNIFICANT CHANGE UP (ref 1–1.05)
SPECIMEN SOURCE: SIGNIFICANT CHANGE UP
T PALLIDUM AB TITR SER: NEGATIVE — SIGNIFICANT CHANGE UP
T4 FREE SERPL-MCNC: 1.1 NG/DL — SIGNIFICANT CHANGE UP (ref 0.9–1.8)
THC UR QL: POSITIVE
TOTAL CELLS COUNTED, SPINAL FLUID: 100 CELLS — SIGNIFICANT CHANGE UP
TSH SERPL-MCNC: 0.5 UIU/ML — SIGNIFICANT CHANGE UP (ref 0.27–4.2)
TSH SERPL-MCNC: 0.72 UIU/ML — SIGNIFICANT CHANGE UP (ref 0.27–4.2)
TUBE TYPE: SIGNIFICANT CHANGE UP
UROBILINOGEN FLD QL: ABNORMAL
UROBILINOGEN FLD QL: SIGNIFICANT CHANGE UP
VANCOMYCIN TROUGH SERPL-MCNC: 9.2 UG/ML — LOW (ref 10–20)
VIT B12 SERPL-MCNC: 874 PG/ML — SIGNIFICANT CHANGE UP (ref 200–900)
WBC # BLD: 3.21 K/UL — LOW (ref 3.8–10.5)
WBC # BLD: 3.33 K/UL — LOW (ref 3.8–10.5)
WBC # BLD: 4.18 K/UL — SIGNIFICANT CHANGE UP (ref 3.8–10.5)
WBC # BLD: 4.85 K/UL — SIGNIFICANT CHANGE UP (ref 3.8–10.5)
WBC # BLD: 5.26 K/UL — SIGNIFICANT CHANGE UP (ref 3.8–10.5)
WBC # BLD: 5.3 K/UL — SIGNIFICANT CHANGE UP (ref 3.8–10.5)
WBC # BLD: 5.38 K/UL — SIGNIFICANT CHANGE UP (ref 3.8–10.5)
WBC # BLD: 5.66 K/UL — SIGNIFICANT CHANGE UP (ref 3.8–10.5)
WBC # BLD: 5.73 K/UL — SIGNIFICANT CHANGE UP (ref 3.8–10.5)
WBC # FLD AUTO: 3.21 K/UL — LOW (ref 3.8–10.5)
WBC # FLD AUTO: 3.33 K/UL — LOW (ref 3.8–10.5)
WBC # FLD AUTO: 4.18 K/UL — SIGNIFICANT CHANGE UP (ref 3.8–10.5)
WBC # FLD AUTO: 4.85 K/UL — SIGNIFICANT CHANGE UP (ref 3.8–10.5)
WBC # FLD AUTO: 5.26 K/UL — SIGNIFICANT CHANGE UP (ref 3.8–10.5)
WBC # FLD AUTO: 5.3 K/UL — SIGNIFICANT CHANGE UP (ref 3.8–10.5)
WBC # FLD AUTO: 5.38 K/UL — SIGNIFICANT CHANGE UP (ref 3.8–10.5)
WBC # FLD AUTO: 5.66 K/UL — SIGNIFICANT CHANGE UP (ref 3.8–10.5)
WBC # FLD AUTO: 5.73 K/UL — SIGNIFICANT CHANGE UP (ref 3.8–10.5)
WBC UR QL: 3 /HPF — SIGNIFICANT CHANGE UP (ref 0–5)
WBC UR QL: 4 /HPF — SIGNIFICANT CHANGE UP (ref 0–5)

## 2021-01-01 PROCEDURE — 99223 1ST HOSP IP/OBS HIGH 75: CPT

## 2021-01-01 PROCEDURE — 99233 SBSQ HOSP IP/OBS HIGH 50: CPT

## 2021-01-01 PROCEDURE — 99232 SBSQ HOSP IP/OBS MODERATE 35: CPT

## 2021-01-01 PROCEDURE — 99255 IP/OBS CONSLTJ NEW/EST HI 80: CPT

## 2021-01-01 PROCEDURE — 70450 CT HEAD/BRAIN W/O DYE: CPT | Mod: 26

## 2021-01-01 PROCEDURE — 95816 EEG AWAKE AND DROWSY: CPT | Mod: 26

## 2021-01-01 PROCEDURE — 71045 X-RAY EXAM CHEST 1 VIEW: CPT | Mod: 26

## 2021-01-01 PROCEDURE — 76536 US EXAM OF HEAD AND NECK: CPT | Mod: 26

## 2021-01-01 PROCEDURE — 71250 CT THORAX DX C-: CPT | Mod: 26

## 2021-01-01 PROCEDURE — 99254 IP/OBS CNSLTJ NEW/EST MOD 60: CPT

## 2021-01-01 PROCEDURE — 74177 CT ABD & PELVIS W/CONTRAST: CPT | Mod: 26

## 2021-01-01 PROCEDURE — 99232 SBSQ HOSP IP/OBS MODERATE 35: CPT | Mod: GC

## 2021-01-01 PROCEDURE — 99222 1ST HOSP IP/OBS MODERATE 55: CPT

## 2021-01-01 PROCEDURE — 72170 X-RAY EXAM OF PELVIS: CPT | Mod: 26

## 2021-01-01 PROCEDURE — 99053 MED SERV 10PM-8AM 24 HR FAC: CPT

## 2021-01-01 PROCEDURE — 93971 EXTREMITY STUDY: CPT | Mod: 26

## 2021-01-01 PROCEDURE — 99285 EMERGENCY DEPT VISIT HI MDM: CPT

## 2021-01-01 PROCEDURE — 72020 X-RAY EXAM OF SPINE 1 VIEW: CPT | Mod: 26

## 2021-01-01 PROCEDURE — 62328 DX LMBR SPI PNXR W/FLUOR/CT: CPT

## 2021-01-01 RX ORDER — SODIUM,POTASSIUM PHOSPHATES 278-250MG
1 POWDER IN PACKET (EA) ORAL THREE TIMES A DAY
Refills: 0 | Status: COMPLETED | OUTPATIENT
Start: 2021-01-01 | End: 2021-01-01

## 2021-01-01 RX ORDER — VANCOMYCIN HCL 1 G
1000 VIAL (EA) INTRAVENOUS ONCE
Refills: 0 | Status: COMPLETED | OUTPATIENT
Start: 2021-01-01 | End: 2021-01-01

## 2021-01-01 RX ORDER — ACETAMINOPHEN 500 MG
1000 TABLET ORAL ONCE
Refills: 0 | Status: COMPLETED | OUTPATIENT
Start: 2021-01-01 | End: 2021-01-01

## 2021-01-01 RX ORDER — LANOLIN ALCOHOL/MO/W.PET/CERES
3 CREAM (GRAM) TOPICAL AT BEDTIME
Refills: 0 | Status: DISCONTINUED | OUTPATIENT
Start: 2021-01-01 | End: 2021-01-01

## 2021-01-01 RX ORDER — ACYCLOVIR SODIUM 500 MG
600 VIAL (EA) INTRAVENOUS EVERY 8 HOURS
Refills: 0 | Status: DISCONTINUED | OUTPATIENT
Start: 2021-01-01 | End: 2021-01-01

## 2021-01-01 RX ORDER — ONDANSETRON 8 MG/1
4 TABLET, FILM COATED ORAL EVERY 8 HOURS
Refills: 0 | Status: DISCONTINUED | OUTPATIENT
Start: 2021-01-01 | End: 2021-01-01

## 2021-01-01 RX ORDER — VANCOMYCIN HCL 1 G
1000 VIAL (EA) INTRAVENOUS EVERY 12 HOURS
Refills: 0 | Status: DISCONTINUED | OUTPATIENT
Start: 2021-01-01 | End: 2021-01-01

## 2021-01-01 RX ORDER — SODIUM CHLORIDE 9 MG/ML
1000 INJECTION INTRAMUSCULAR; INTRAVENOUS; SUBCUTANEOUS ONCE
Refills: 0 | Status: COMPLETED | OUTPATIENT
Start: 2021-01-01 | End: 2021-01-01

## 2021-01-01 RX ORDER — DEXTROSE 50 % IN WATER 50 %
12.5 SYRINGE (ML) INTRAVENOUS ONCE
Refills: 0 | Status: DISCONTINUED | OUTPATIENT
Start: 2021-01-01 | End: 2021-01-01

## 2021-01-01 RX ORDER — DEXTROSE 50 % IN WATER 50 %
25 SYRINGE (ML) INTRAVENOUS ONCE
Refills: 0 | Status: DISCONTINUED | OUTPATIENT
Start: 2021-01-01 | End: 2021-01-01

## 2021-01-01 RX ORDER — CEFTRIAXONE 500 MG/1
1000 INJECTION, POWDER, FOR SOLUTION INTRAMUSCULAR; INTRAVENOUS EVERY 24 HOURS
Refills: 0 | Status: DISCONTINUED | OUTPATIENT
Start: 2021-01-01 | End: 2021-01-01

## 2021-01-01 RX ORDER — TRAMADOL HYDROCHLORIDE 50 MG/1
25 TABLET ORAL ONCE
Refills: 0 | Status: DISCONTINUED | OUTPATIENT
Start: 2021-01-01 | End: 2021-01-01

## 2021-01-01 RX ORDER — LACTOBACILLUS ACIDOPHILUS 100MM CELL
1 CAPSULE ORAL
Refills: 0 | Status: DISCONTINUED | OUTPATIENT
Start: 2021-01-01 | End: 2021-01-01

## 2021-01-01 RX ORDER — POTASSIUM PHOSPHATE, MONOBASIC POTASSIUM PHOSPHATE, DIBASIC 236; 224 MG/ML; MG/ML
15 INJECTION, SOLUTION INTRAVENOUS ONCE
Refills: 0 | Status: COMPLETED | OUTPATIENT
Start: 2021-01-01 | End: 2021-01-01

## 2021-01-01 RX ORDER — SODIUM CHLORIDE 9 MG/ML
1000 INJECTION INTRAMUSCULAR; INTRAVENOUS; SUBCUTANEOUS
Refills: 0 | Status: DISCONTINUED | OUTPATIENT
Start: 2021-01-01 | End: 2021-01-01

## 2021-01-01 RX ORDER — GLUCAGON INJECTION, SOLUTION 0.5 MG/.1ML
1 INJECTION, SOLUTION SUBCUTANEOUS ONCE
Refills: 0 | Status: DISCONTINUED | OUTPATIENT
Start: 2021-01-01 | End: 2021-01-01

## 2021-01-01 RX ORDER — ENOXAPARIN SODIUM 100 MG/ML
40 INJECTION SUBCUTANEOUS DAILY
Refills: 0 | Status: DISCONTINUED | OUTPATIENT
Start: 2021-01-01 | End: 2021-01-01

## 2021-01-01 RX ORDER — SODIUM CHLORIDE 9 MG/ML
1000 INJECTION, SOLUTION INTRAVENOUS
Refills: 0 | Status: DISCONTINUED | OUTPATIENT
Start: 2021-01-01 | End: 2021-01-01

## 2021-01-01 RX ORDER — CEFTRIAXONE 500 MG/1
2000 INJECTION, POWDER, FOR SOLUTION INTRAMUSCULAR; INTRAVENOUS ONCE
Refills: 0 | Status: COMPLETED | OUTPATIENT
Start: 2021-01-01 | End: 2021-01-01

## 2021-01-01 RX ORDER — ACETAMINOPHEN 500 MG
650 TABLET ORAL EVERY 6 HOURS
Refills: 0 | Status: DISCONTINUED | OUTPATIENT
Start: 2021-01-01 | End: 2021-01-01

## 2021-01-01 RX ORDER — LIDOCAINE 4 G/100G
1 CREAM TOPICAL EVERY 24 HOURS
Refills: 0 | Status: DISCONTINUED | OUTPATIENT
Start: 2021-01-01 | End: 2021-01-01

## 2021-01-01 RX ORDER — INSULIN LISPRO 100/ML
VIAL (ML) SUBCUTANEOUS
Refills: 0 | Status: DISCONTINUED | OUTPATIENT
Start: 2021-01-01 | End: 2021-01-01

## 2021-01-01 RX ORDER — ACETAMINOPHEN 500 MG
650 TABLET ORAL ONCE
Refills: 0 | Status: COMPLETED | OUTPATIENT
Start: 2021-01-01 | End: 2021-01-01

## 2021-01-01 RX ORDER — LISINOPRIL 2.5 MG/1
5 TABLET ORAL DAILY
Refills: 0 | Status: DISCONTINUED | OUTPATIENT
Start: 2021-01-01 | End: 2021-01-01

## 2021-01-01 RX ORDER — VANCOMYCIN HCL 1 G
VIAL (EA) INTRAVENOUS
Refills: 0 | Status: DISCONTINUED | OUTPATIENT
Start: 2021-01-01 | End: 2021-01-01

## 2021-01-01 RX ORDER — ACYCLOVIR SODIUM 500 MG
VIAL (EA) INTRAVENOUS
Refills: 0 | Status: DISCONTINUED | OUTPATIENT
Start: 2021-01-01 | End: 2021-01-01

## 2021-01-01 RX ORDER — FOLIC ACID 0.8 MG
1 TABLET ORAL DAILY
Refills: 0 | Status: DISCONTINUED | OUTPATIENT
Start: 2021-01-01 | End: 2021-01-01

## 2021-01-01 RX ORDER — CEFTRIAXONE 500 MG/1
2000 INJECTION, POWDER, FOR SOLUTION INTRAMUSCULAR; INTRAVENOUS EVERY 12 HOURS
Refills: 0 | Status: DISCONTINUED | OUTPATIENT
Start: 2021-01-01 | End: 2021-01-01

## 2021-01-01 RX ORDER — ACYCLOVIR SODIUM 500 MG
600 VIAL (EA) INTRAVENOUS ONCE
Refills: 0 | Status: COMPLETED | OUTPATIENT
Start: 2021-01-01 | End: 2021-01-01

## 2021-01-01 RX ORDER — SODIUM ZIRCONIUM CYCLOSILICATE 10 G/10G
5 POWDER, FOR SUSPENSION ORAL ONCE
Refills: 0 | Status: COMPLETED | OUTPATIENT
Start: 2021-01-01 | End: 2021-01-01

## 2021-01-01 RX ORDER — SODIUM CHLORIDE 9 MG/ML
1 INJECTION INTRAMUSCULAR; INTRAVENOUS; SUBCUTANEOUS THREE TIMES A DAY
Refills: 0 | Status: DISCONTINUED | OUTPATIENT
Start: 2021-01-01 | End: 2021-01-01

## 2021-01-01 RX ORDER — TAMSULOSIN HYDROCHLORIDE 0.4 MG/1
0.4 CAPSULE ORAL AT BEDTIME
Refills: 0 | Status: DISCONTINUED | OUTPATIENT
Start: 2021-01-01 | End: 2021-01-01

## 2021-01-01 RX ORDER — VANCOMYCIN HCL 1 G
1250 VIAL (EA) INTRAVENOUS EVERY 12 HOURS
Refills: 0 | Status: DISCONTINUED | OUTPATIENT
Start: 2021-01-01 | End: 2021-01-01

## 2021-01-01 RX ORDER — AMPICILLIN TRIHYDRATE 250 MG
CAPSULE ORAL
Refills: 0 | Status: DISCONTINUED | OUTPATIENT
Start: 2021-01-01 | End: 2021-01-01

## 2021-01-01 RX ORDER — SODIUM CHLORIDE 5 G/100ML
500 INJECTION, SOLUTION INTRAVENOUS
Refills: 0 | Status: DISCONTINUED | OUTPATIENT
Start: 2021-01-01 | End: 2021-01-01

## 2021-01-01 RX ORDER — THIAMINE MONONITRATE (VIT B1) 100 MG
500 TABLET ORAL DAILY
Refills: 0 | Status: COMPLETED | OUTPATIENT
Start: 2021-01-01 | End: 2021-01-01

## 2021-01-01 RX ORDER — SODIUM,POTASSIUM PHOSPHATES 278-250MG
1 POWDER IN PACKET (EA) ORAL
Refills: 0 | Status: COMPLETED | OUTPATIENT
Start: 2021-01-01 | End: 2021-01-01

## 2021-01-01 RX ORDER — DEXTROSE 50 % IN WATER 50 %
15 SYRINGE (ML) INTRAVENOUS ONCE
Refills: 0 | Status: DISCONTINUED | OUTPATIENT
Start: 2021-01-01 | End: 2021-01-01

## 2021-01-01 RX ORDER — AMPICILLIN TRIHYDRATE 250 MG
2 CAPSULE ORAL EVERY 4 HOURS
Refills: 0 | Status: DISCONTINUED | OUTPATIENT
Start: 2021-01-01 | End: 2021-01-01

## 2021-01-01 RX ORDER — CEFTRIAXONE 500 MG/1
INJECTION, POWDER, FOR SOLUTION INTRAMUSCULAR; INTRAVENOUS
Refills: 0 | Status: DISCONTINUED | OUTPATIENT
Start: 2021-01-01 | End: 2021-01-01

## 2021-01-01 RX ORDER — AMPICILLIN TRIHYDRATE 250 MG
2 CAPSULE ORAL ONCE
Refills: 0 | Status: COMPLETED | OUTPATIENT
Start: 2021-01-01 | End: 2021-01-01

## 2021-01-01 RX ADMIN — Medication 1 TABLET(S): at 18:15

## 2021-01-01 RX ADMIN — Medication 1 MILLIGRAM(S): at 13:36

## 2021-01-01 RX ADMIN — Medication 105 MILLIGRAM(S): at 13:13

## 2021-01-01 RX ADMIN — Medication 2 MILLIGRAM(S): at 13:58

## 2021-01-01 RX ADMIN — Medication 216 GRAM(S): at 20:50

## 2021-01-01 RX ADMIN — LIDOCAINE 1 PATCH: 4 CREAM TOPICAL at 13:56

## 2021-01-01 RX ADMIN — LISINOPRIL 5 MILLIGRAM(S): 2.5 TABLET ORAL at 12:11

## 2021-01-01 RX ADMIN — Medication 650 MILLIGRAM(S): at 15:04

## 2021-01-01 RX ADMIN — Medication 63.75 MILLIMOLE(S): at 01:37

## 2021-01-01 RX ADMIN — SODIUM CHLORIDE 1 GRAM(S): 9 INJECTION INTRAMUSCULAR; INTRAVENOUS; SUBCUTANEOUS at 13:52

## 2021-01-01 RX ADMIN — SODIUM CHLORIDE 1 GRAM(S): 9 INJECTION INTRAMUSCULAR; INTRAVENOUS; SUBCUTANEOUS at 06:09

## 2021-01-01 RX ADMIN — Medication 1 TABLET(S): at 17:40

## 2021-01-01 RX ADMIN — LIDOCAINE 1 PATCH: 4 CREAM TOPICAL at 08:26

## 2021-01-01 RX ADMIN — LIDOCAINE 1 PATCH: 4 CREAM TOPICAL at 08:02

## 2021-01-01 RX ADMIN — Medication 1000 MILLIGRAM(S): at 12:10

## 2021-01-01 RX ADMIN — Medication 2 MILLIGRAM(S): at 17:55

## 2021-01-01 RX ADMIN — Medication 1 MILLIGRAM(S): at 11:19

## 2021-01-01 RX ADMIN — Medication 650 MILLIGRAM(S): at 23:20

## 2021-01-01 RX ADMIN — Medication 400 MILLIGRAM(S): at 11:47

## 2021-01-01 RX ADMIN — SODIUM CHLORIDE 1 GRAM(S): 9 INJECTION INTRAMUSCULAR; INTRAVENOUS; SUBCUTANEOUS at 06:20

## 2021-01-01 RX ADMIN — SODIUM CHLORIDE 1 GRAM(S): 9 INJECTION INTRAMUSCULAR; INTRAVENOUS; SUBCUTANEOUS at 21:55

## 2021-01-01 RX ADMIN — SODIUM CHLORIDE 1 GRAM(S): 9 INJECTION INTRAMUSCULAR; INTRAVENOUS; SUBCUTANEOUS at 13:10

## 2021-01-01 RX ADMIN — Medication 216 GRAM(S): at 10:24

## 2021-01-01 RX ADMIN — SODIUM CHLORIDE 1 GRAM(S): 9 INJECTION INTRAMUSCULAR; INTRAVENOUS; SUBCUTANEOUS at 21:10

## 2021-01-01 RX ADMIN — LIDOCAINE 1 PATCH: 4 CREAM TOPICAL at 22:32

## 2021-01-01 RX ADMIN — LIDOCAINE 1 PATCH: 4 CREAM TOPICAL at 10:51

## 2021-01-01 RX ADMIN — TRAMADOL HYDROCHLORIDE 25 MILLIGRAM(S): 50 TABLET ORAL at 00:30

## 2021-01-01 RX ADMIN — Medication 1 TABLET(S): at 08:39

## 2021-01-01 RX ADMIN — SODIUM ZIRCONIUM CYCLOSILICATE 5 GRAM(S): 10 POWDER, FOR SUSPENSION ORAL at 01:37

## 2021-01-01 RX ADMIN — Medication 650 MILLIGRAM(S): at 01:37

## 2021-01-01 RX ADMIN — LISINOPRIL 5 MILLIGRAM(S): 2.5 TABLET ORAL at 05:38

## 2021-01-01 RX ADMIN — Medication 1 TABLET(S): at 18:04

## 2021-01-01 RX ADMIN — TAMSULOSIN HYDROCHLORIDE 0.4 MILLIGRAM(S): 0.4 CAPSULE ORAL at 22:33

## 2021-01-01 RX ADMIN — TRAMADOL HYDROCHLORIDE 25 MILLIGRAM(S): 50 TABLET ORAL at 23:48

## 2021-01-01 RX ADMIN — Medication 1 TABLET(S): at 09:20

## 2021-01-01 RX ADMIN — SODIUM CHLORIDE 75 MILLILITER(S): 9 INJECTION INTRAMUSCULAR; INTRAVENOUS; SUBCUTANEOUS at 12:38

## 2021-01-01 RX ADMIN — Medication 1 TABLET(S): at 18:10

## 2021-01-01 RX ADMIN — LIDOCAINE 1 PATCH: 4 CREAM TOPICAL at 22:51

## 2021-01-01 RX ADMIN — Medication 1 MILLIGRAM(S): at 12:11

## 2021-01-01 RX ADMIN — Medication 30 MILLILITER(S): at 23:15

## 2021-01-01 RX ADMIN — Medication 166.67 MILLIGRAM(S): at 09:37

## 2021-01-01 RX ADMIN — Medication 1000 MILLIGRAM(S): at 08:25

## 2021-01-01 RX ADMIN — TAMSULOSIN HYDROCHLORIDE 0.4 MILLIGRAM(S): 0.4 CAPSULE ORAL at 21:49

## 2021-01-01 RX ADMIN — Medication 1 TABLET(S): at 17:29

## 2021-01-01 RX ADMIN — Medication 3 MILLIGRAM(S): at 20:50

## 2021-01-01 RX ADMIN — Medication 1 TABLET(S): at 16:32

## 2021-01-01 RX ADMIN — Medication 1 TABLET(S): at 11:19

## 2021-01-01 RX ADMIN — Medication 262 MILLIGRAM(S): at 02:50

## 2021-01-01 RX ADMIN — Medication 650 MILLIGRAM(S): at 21:17

## 2021-01-01 RX ADMIN — Medication 3 MILLIGRAM(S): at 21:50

## 2021-01-01 RX ADMIN — CEFTRIAXONE 100 MILLIGRAM(S): 500 INJECTION, POWDER, FOR SOLUTION INTRAMUSCULAR; INTRAVENOUS at 06:17

## 2021-01-01 RX ADMIN — Medication 400 MILLIGRAM(S): at 06:22

## 2021-01-01 RX ADMIN — SODIUM CHLORIDE 1 GRAM(S): 9 INJECTION INTRAMUSCULAR; INTRAVENOUS; SUBCUTANEOUS at 21:49

## 2021-01-01 RX ADMIN — Medication 1 TABLET(S): at 12:12

## 2021-01-01 RX ADMIN — Medication 1 TABLET(S): at 18:11

## 2021-01-01 RX ADMIN — Medication 1 TABLET(S): at 13:09

## 2021-01-01 RX ADMIN — LIDOCAINE 1 PATCH: 4 CREAM TOPICAL at 22:46

## 2021-01-01 RX ADMIN — Medication 1 TABLET(S): at 13:36

## 2021-01-01 RX ADMIN — LIDOCAINE 1 PATCH: 4 CREAM TOPICAL at 22:34

## 2021-01-01 RX ADMIN — Medication 260 MILLIGRAM(S): at 03:02

## 2021-01-01 RX ADMIN — LISINOPRIL 5 MILLIGRAM(S): 2.5 TABLET ORAL at 17:57

## 2021-01-01 RX ADMIN — LIDOCAINE 1 PATCH: 4 CREAM TOPICAL at 00:42

## 2021-01-01 RX ADMIN — LISINOPRIL 5 MILLIGRAM(S): 2.5 TABLET ORAL at 05:49

## 2021-01-01 RX ADMIN — Medication 650 MILLIGRAM(S): at 19:44

## 2021-01-01 RX ADMIN — Medication 650 MILLIGRAM(S): at 22:33

## 2021-01-01 RX ADMIN — Medication 650 MILLIGRAM(S): at 21:55

## 2021-01-01 RX ADMIN — Medication 2 MILLIGRAM(S): at 22:14

## 2021-01-01 RX ADMIN — LIDOCAINE 1 PATCH: 4 CREAM TOPICAL at 07:36

## 2021-01-01 RX ADMIN — Medication 1 PACKET(S): at 22:35

## 2021-01-01 RX ADMIN — Medication 1 TABLET(S): at 08:46

## 2021-01-01 RX ADMIN — LISINOPRIL 5 MILLIGRAM(S): 2.5 TABLET ORAL at 06:18

## 2021-01-01 RX ADMIN — SODIUM CHLORIDE 1 GRAM(S): 9 INJECTION INTRAMUSCULAR; INTRAVENOUS; SUBCUTANEOUS at 12:11

## 2021-01-01 RX ADMIN — Medication 2 MILLIGRAM(S): at 15:07

## 2021-01-01 RX ADMIN — Medication 1 MILLIGRAM(S): at 13:53

## 2021-01-01 RX ADMIN — Medication 650 MILLIGRAM(S): at 15:45

## 2021-01-01 RX ADMIN — SODIUM CHLORIDE 80 MILLILITER(S): 5 INJECTION, SOLUTION INTRAVENOUS at 04:59

## 2021-01-01 RX ADMIN — Medication 1 MILLIGRAM(S): at 13:33

## 2021-01-01 RX ADMIN — LISINOPRIL 5 MILLIGRAM(S): 2.5 TABLET ORAL at 05:33

## 2021-01-01 RX ADMIN — LISINOPRIL 5 MILLIGRAM(S): 2.5 TABLET ORAL at 06:26

## 2021-01-01 RX ADMIN — Medication 166.67 MILLIGRAM(S): at 09:20

## 2021-01-01 RX ADMIN — CEFTRIAXONE 100 MILLIGRAM(S): 500 INJECTION, POWDER, FOR SOLUTION INTRAMUSCULAR; INTRAVENOUS at 16:52

## 2021-01-01 RX ADMIN — Medication 216 GRAM(S): at 16:12

## 2021-01-01 RX ADMIN — LIDOCAINE 1 PATCH: 4 CREAM TOPICAL at 07:56

## 2021-01-01 RX ADMIN — SODIUM CHLORIDE 50 MILLILITER(S): 5 INJECTION, SOLUTION INTRAVENOUS at 21:25

## 2021-01-01 RX ADMIN — SODIUM CHLORIDE 1 GRAM(S): 9 INJECTION INTRAMUSCULAR; INTRAVENOUS; SUBCUTANEOUS at 06:26

## 2021-01-01 RX ADMIN — CEFTRIAXONE 100 MILLIGRAM(S): 500 INJECTION, POWDER, FOR SOLUTION INTRAMUSCULAR; INTRAVENOUS at 05:33

## 2021-01-01 RX ADMIN — Medication 400 MILLIGRAM(S): at 06:25

## 2021-01-01 RX ADMIN — Medication 3 MILLIGRAM(S): at 21:55

## 2021-01-01 RX ADMIN — SODIUM CHLORIDE 1 GRAM(S): 9 INJECTION INTRAMUSCULAR; INTRAVENOUS; SUBCUTANEOUS at 13:26

## 2021-01-01 RX ADMIN — LISINOPRIL 5 MILLIGRAM(S): 2.5 TABLET ORAL at 06:09

## 2021-01-01 RX ADMIN — Medication 650 MILLIGRAM(S): at 15:15

## 2021-01-01 RX ADMIN — Medication 650 MILLIGRAM(S): at 07:30

## 2021-01-01 RX ADMIN — Medication 1 TABLET(S): at 08:44

## 2021-01-01 RX ADMIN — Medication 262 MILLIGRAM(S): at 18:38

## 2021-01-01 RX ADMIN — Medication 650 MILLIGRAM(S): at 00:47

## 2021-01-01 RX ADMIN — SODIUM CHLORIDE 1 GRAM(S): 9 INJECTION INTRAMUSCULAR; INTRAVENOUS; SUBCUTANEOUS at 13:35

## 2021-01-01 RX ADMIN — Medication 650 MILLIGRAM(S): at 23:02

## 2021-01-01 RX ADMIN — SODIUM CHLORIDE 1 GRAM(S): 9 INJECTION INTRAMUSCULAR; INTRAVENOUS; SUBCUTANEOUS at 22:34

## 2021-01-01 RX ADMIN — Medication 650 MILLIGRAM(S): at 03:24

## 2021-01-01 RX ADMIN — TAMSULOSIN HYDROCHLORIDE 0.4 MILLIGRAM(S): 0.4 CAPSULE ORAL at 21:55

## 2021-01-01 RX ADMIN — Medication 105 MILLIGRAM(S): at 13:31

## 2021-01-01 RX ADMIN — Medication 1 MILLIGRAM(S): at 12:45

## 2021-01-01 RX ADMIN — POTASSIUM PHOSPHATE, MONOBASIC POTASSIUM PHOSPHATE, DIBASIC 62.5 MILLIMOLE(S): 236; 224 INJECTION, SOLUTION INTRAVENOUS at 13:09

## 2021-01-01 RX ADMIN — Medication 650 MILLIGRAM(S): at 21:45

## 2021-01-01 RX ADMIN — Medication 650 MILLIGRAM(S): at 06:09

## 2021-01-01 RX ADMIN — Medication 2 MILLIGRAM(S): at 10:02

## 2021-01-01 RX ADMIN — Medication 216 GRAM(S): at 23:48

## 2021-01-01 RX ADMIN — TAMSULOSIN HYDROCHLORIDE 0.4 MILLIGRAM(S): 0.4 CAPSULE ORAL at 22:32

## 2021-01-01 RX ADMIN — Medication 3 MILLIGRAM(S): at 21:10

## 2021-01-01 RX ADMIN — Medication 650 MILLIGRAM(S): at 20:50

## 2021-01-01 RX ADMIN — Medication 105 MILLIGRAM(S): at 14:16

## 2021-01-01 RX ADMIN — Medication 1 MILLIGRAM(S): at 18:11

## 2021-01-01 RX ADMIN — CEFTRIAXONE 100 MILLIGRAM(S): 500 INJECTION, POWDER, FOR SOLUTION INTRAMUSCULAR; INTRAVENOUS at 06:27

## 2021-01-01 RX ADMIN — Medication 650 MILLIGRAM(S): at 10:37

## 2021-01-01 RX ADMIN — CEFTRIAXONE 100 MILLIGRAM(S): 500 INJECTION, POWDER, FOR SOLUTION INTRAMUSCULAR; INTRAVENOUS at 06:42

## 2021-01-01 RX ADMIN — LIDOCAINE 1 PATCH: 4 CREAM TOPICAL at 00:40

## 2021-01-01 RX ADMIN — LIDOCAINE 1 PATCH: 4 CREAM TOPICAL at 23:18

## 2021-01-01 RX ADMIN — LIDOCAINE 1 PATCH: 4 CREAM TOPICAL at 12:36

## 2021-01-01 RX ADMIN — Medication 250 MILLIGRAM(S): at 17:23

## 2021-01-01 RX ADMIN — SODIUM CHLORIDE 80 MILLILITER(S): 5 INJECTION, SOLUTION INTRAVENOUS at 14:54

## 2021-01-01 RX ADMIN — Medication 1 TABLET(S): at 13:33

## 2021-01-01 RX ADMIN — POTASSIUM PHOSPHATE, MONOBASIC POTASSIUM PHOSPHATE, DIBASIC 62.5 MILLIMOLE(S): 236; 224 INJECTION, SOLUTION INTRAVENOUS at 08:40

## 2021-01-01 RX ADMIN — Medication 1 MILLIGRAM(S): at 13:10

## 2021-01-01 RX ADMIN — Medication 3 MILLIGRAM(S): at 22:33

## 2021-01-01 RX ADMIN — Medication 166.67 MILLIGRAM(S): at 20:41

## 2021-01-01 RX ADMIN — Medication 250 MILLIGRAM(S): at 06:59

## 2021-01-01 RX ADMIN — Medication 650 MILLIGRAM(S): at 05:40

## 2021-01-01 RX ADMIN — Medication 1 MILLIGRAM(S): at 04:57

## 2021-01-01 RX ADMIN — Medication 1 TABLET(S): at 12:45

## 2021-01-01 RX ADMIN — Medication 2 MILLIGRAM(S): at 01:37

## 2021-01-01 RX ADMIN — Medication 1 TABLET(S): at 13:53

## 2021-01-01 RX ADMIN — Medication 216 GRAM(S): at 05:32

## 2021-01-01 RX ADMIN — LIDOCAINE 1 PATCH: 4 CREAM TOPICAL at 10:11

## 2021-01-01 RX ADMIN — Medication 1 PACKET(S): at 18:15

## 2021-01-01 RX ADMIN — TAMSULOSIN HYDROCHLORIDE 0.4 MILLIGRAM(S): 0.4 CAPSULE ORAL at 21:10

## 2021-01-01 RX ADMIN — TAMSULOSIN HYDROCHLORIDE 0.4 MILLIGRAM(S): 0.4 CAPSULE ORAL at 22:14

## 2021-01-01 RX ADMIN — Medication 1 PACKET(S): at 13:35

## 2021-01-01 RX ADMIN — SODIUM CHLORIDE 1000 MILLILITER(S): 9 INJECTION INTRAMUSCULAR; INTRAVENOUS; SUBCUTANEOUS at 03:25

## 2021-01-01 RX ADMIN — SODIUM CHLORIDE 80 MILLILITER(S): 5 INJECTION, SOLUTION INTRAVENOUS at 20:42

## 2021-01-01 RX ADMIN — Medication 2 MILLIGRAM(S): at 04:38

## 2021-01-01 RX ADMIN — Medication 262 MILLIGRAM(S): at 12:35

## 2021-01-01 RX ADMIN — Medication 650 MILLIGRAM(S): at 14:47

## 2021-01-01 RX ADMIN — Medication 1 TABLET(S): at 17:36

## 2021-01-01 RX ADMIN — Medication 650 MILLIGRAM(S): at 13:11

## 2021-01-01 RX ADMIN — Medication 250 MILLIGRAM(S): at 18:11

## 2021-01-01 RX ADMIN — LIDOCAINE 1 PATCH: 4 CREAM TOPICAL at 23:48

## 2021-01-01 RX ADMIN — Medication 650 MILLIGRAM(S): at 11:07

## 2021-01-01 RX ADMIN — CEFTRIAXONE 100 MILLIGRAM(S): 500 INJECTION, POWDER, FOR SOLUTION INTRAMUSCULAR; INTRAVENOUS at 05:43

## 2021-10-15 NOTE — H&P ADULT - PROBLEM SELECTOR PLAN 2
will cont on symptoms triggered ativan, ciwa protocol, thiamine high dose, mvi, folate  cont 1:1 obsn

## 2021-10-15 NOTE — ED ADULT NURSE REASSESSMENT NOTE - NS ED NURSE REASSESS COMMENT FT1
Pt in room 25. Pt appears restless and agitated and refuses to stay in bed for the past hour. Called MAR [phone] to make aware of the situation. MAR states they will not medicate him until he is to be assessed. MAR states they will come down within 20 minutes to assess him. Pt currently back in bed and resting. Will CTM.

## 2021-10-15 NOTE — ED PROVIDER NOTE - CONSTITUTIONAL, MLM
laying with eyes closed but answering questions, awake, alert, oriented to person, place, time/situation and in no apparent distress. normal...

## 2021-10-15 NOTE — H&P ADULT - PROBLEM SELECTOR PLAN 7
low risk, encourage early ambulation Pt not on meds, will add norvasc for bp control if persistently elevated

## 2021-10-15 NOTE — H&P ADULT - MENTAL STATUS
somnolent but arousable, slow to answer questions, oriented to self, thinks he is in [place] [facility], 21 Oct somnolent but arousable, slow to answer questions, oriented to self, thinks he is in [place] [facility], 21, Oct
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
## 2021-10-15 NOTE — ED PROVIDER NOTE - CLINICAL SUMMARY MEDICAL DECISION MAKING FREE TEXT BOX
61M with hx of DM and HTN, possible etoh w/d but states last drink 4 weeks ago. labs, xrays, ativan for tremor.

## 2021-10-15 NOTE — ED ADULT NURSE NOTE - INTERVENTIONS DEFINITIONS
[place] to call system/Call bell, personal items and telephone within reach/Instruct patient to call for assistance/Stretcher in lowest position, wheels locked, appropriate side rails in place

## 2021-10-15 NOTE — H&P ADULT - NS MD HP PULSE RADIAL
PARoxetine (PAXIL) 20 MG tablet     Last Written Prescription Date: 11/03/2016  Last Fill Quantity: 90, # refills: 2  Last Office Visit with [facility] primary care provider:  8/12/2016        Last PHQ-9 score on record= No flowsheet data found.            omeprazole (PRILOSEC) 20 MG capsule      Last Written Prescription Date: 11/03/2016  Last Fill Quantity: 90,  # refills: 2   Last Office Visit with [facility], [facility] or [facility] prescribing provider: 8/12/2016                                                right normal/left normal
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
## 2021-10-15 NOTE — H&P ADULT - PROBLEM SELECTOR PLAN 6
? alcohol related  check b12, folate and tsh pt reports that he does not take meds  check a1c  will keep pt npo for now, swallow eval if passes, start on consistent carb diet, monitor finger stick glucose  SS insulin for now

## 2021-10-15 NOTE — H&P ADULT - PROBLEM SELECTOR PLAN 5
Pt not on meds, will add norvasc for bp control cxr showed with increased b/l reticular and nodular opacities  given h/o smoking will get ct chest

## 2021-10-15 NOTE — H&P ADULT - PROBLEM SELECTOR PLAN 1
possible due to alcohol abuse, pt also was found to have THC in the urine  CT head with no acute findings, high dose thiamine  cont to monitor mental status  cont 1:1 for elopement risk
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
## 2021-10-15 NOTE — H&P ADULT - PROBLEM SELECTOR PLAN 3
Pelvis, neg for fx, cont pain control, if no improvement consider ct L spine Na at 127, will repeat bmp  check urine lytes

## 2021-10-15 NOTE — H&P ADULT - NSHPSOCIALHISTORY_GEN_ALL_CORE
former smoker quit 3 weeks ago, used to smoke 1/3 of a pack daily for 40 years, drinks alcohol daily? however he had told ER that last drink was 4 weeks ago, denies drug use however THC positive in the urine  unable to get any other social history
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
## 2021-10-15 NOTE — ED ADULT NURSE REASSESSMENT NOTE - NS ED NURSE REASSESS COMMENT FT1
Pt restless, trying to elope. Unable to be redirected. MAR called for assessment. Pt placed in unofficial constant observation. Belongings placed in room across from rm 21.

## 2021-10-15 NOTE — H&P ADULT - PROBLEM SELECTOR PLAN 9
low risk, encourage early ambulation  Plan discussed with ACP low risk, encourage early ambulation, venodynes for now  Plan discussed with ACP

## 2021-10-15 NOTE — H&P ADULT - NSHPLABSRESULTS_GEN_ALL_CORE
MEDICATIONS  (STANDING):    MEDICATIONS  (PRN):      Vital Signs Last 24 Hrs  T(C): 36.9 (15 Oct 2021 09:50), Max: 37.1 (15 Oct 2021 01:12)  T(F): 98.5 (15 Oct 2021 09:50), Max: 98.7 (15 Oct 2021 01:12)  HR: 95 (15 Oct 2021 09:50) (72 - 120)  BP: 165/97 (15 Oct 2021 09:50) (128/89 - 175/153)  BP(mean): --  RR: 17 (15 Oct 2021 09:50) (16 - 17)  SpO2: 99% (15 Oct 2021 09:50) (99% - 100%)  CAPILLARY BLOOD GLUCOSE      POCT Blood Glucose.: 133 mg/dL (15 Oct 2021 01:21)    I&O's Summary                            15.2   4.85  )-----------( 355      ( 15 Oct 2021 03:50 )             43.6     10-15    127<L>  |  90<L>  |  33<H>  ----------------------------<  120<H>  5.4<H>   |  22  |  1.20    Ca    10.0      15 Oct 2021 03:50    TPro  8.1  /  Alb  4.0  /  TBili  0.6  /  DBili  x   /  AST  22  /  ALT  11  /  AlkPhos  116  10-15    UA with THC positive, blood trace, urine cult pending,   cxr with increased b/l reticular and nodular opacities

## 2021-10-15 NOTE — SWALLOW BEDSIDE ASSESSMENT ADULT - ADDITIONAL RECOMMENDATIONS
Monitor for any changes in neurological status that may impact oral intake. Reconsult this department as needed

## 2021-10-15 NOTE — SWALLOW BEDSIDE ASSESSMENT ADULT - SWALLOW EVAL: DIAGNOSIS
1. patient demonstrated a functional oral stage of swallow for puree and thin liquids marked by adequate bolus collection, transfer and posterior transport. 2. patient demonstrated a mild oral dysphagia for solids marked by prolonged chewing resulting in delayed collection and transport. Patient utilized liquid wash to clear bolus from oral cavity. 3. patient demonstrated a functional pharyngeal phase of swallow for puree, solids, and thin liquids marked by a present pharyngeal swallow trigger with hyolaryngeal elevation noted upon digital palpation without evidence of airway penetration/aspiration.
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
## 2021-10-15 NOTE — H&P ADULT - PROBLEM SELECTOR PLAN 4
pt reports that he does not take meds  check a1c  consistent carb diet, monitor finger stick glucose Pelvis, neg for fx, cont pain control, if no improvement consider ct L spine
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
## 2021-10-16 NOTE — PATIENT PROFILE ADULT - NSPROGENSOURCEINFO_GEN_A_NUR
Patient a&ox1/2- confused, refusing to answer questions/lacks capacity and has no surrogate decision maker
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
## 2021-10-16 NOTE — PROGRESS NOTE ADULT - PROBLEM SELECTOR PLAN 6
CT chest with findings of pulmonary Langerhans' cell histiocytosis and respiratory bronchiolitis, pulmonary nodules measuring up to 0.8 cm  Repeat CT chest in 3 months

## 2021-10-16 NOTE — PROVIDER CONTACT NOTE (MEDICATION) - ACTION/TREATMENT ORDERED:
ACP made aware, will order Lidocaine patch and 1 time dose of po tramadol, reassess pain 30 mins s/p tramadol dose.

## 2021-10-16 NOTE — PROVIDER CONTACT NOTE (OTHER) - SITUATION
Patient rec'd from ED with  100.7 axillary temp, 164/93 BP, CIWA, 1:1 for elopement. Labs ordered for electrolyte imbalances, meds not given in ED, awaiting for pharmacy to send meds.
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
## 2021-10-16 NOTE — PROGRESS NOTE ADULT - PROBLEM SELECTOR PLAN 9
B12, folate and TSH WNL  Will obtain collateral info DVT ppx: Lovenox  Diet: DASH, consistent carb  Dispo: PT eval, will need to coordinate with pt's family regarding safe discharge plan

## 2021-10-16 NOTE — PROGRESS NOTE ADULT - PROBLEM SELECTOR PLAN 2
Febrile to 100.7 on 10/15  F/u blood cultures  UA neg on admission, COVID neg  Will need to obtain collateral info on baseline mental status- if patient presenting with true AMS will also need to r/o meningitis Febrile to 100.7 on 10/15  F/u blood cultures  UA neg on admission, COVID neg  With AMS, concern for meningitis/encephalitis  Will empirically start vancomycin, acyclovir, ampicillin and CTX  Will attempt LP on 10/17 if patient able to cooperate, please hold DVT ppx  Check CSF protein, glucose, gram stain, PCR panel Febrile to 100.7 on 10/15  F/u blood cultures  UA neg on admission, COVID neg  With AMS, concern for meningitis/encephalitis  Will empirically start vancomycin, acyclovir, ampicillin and CTX  ID consult  Will attempt LP on 10/17 if patient able to cooperate, please hold DVT ppx  Check CSF protein, glucose, gram stain, PCR panel
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
## 2021-10-17 NOTE — PROGRESS NOTE ADULT - PROBLEM SELECTOR PLAN 4
Na 124  Likely hypovolemic hyponatremia, in addition to D5 from abx yesterday  Nephrology consulted, appreciate recs  Trial of NS  Trend BMP q6h  Goal correction 6-8 mEq/24h
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
## 2021-10-17 NOTE — PROGRESS NOTE ADULT - PROBLEM SELECTOR PLAN 2
Febrile to 100.7 on 10/15  F/u blood cultures  UA neg on admission, COVID neg  With AMS, concern for meningitis/encephalitis  Will empirically start vancomycin, acyclovir, ampicillin and CTX  ID consult  Will attempt LP on 10/17 if patient able to cooperate, please hold DVT ppx  Check CSF protein, glucose, gram stain, PCR panel

## 2021-10-17 NOTE — PROGRESS NOTE ADULT - PROBLEM SELECTOR PLAN 8
BP improved  C/w lisinopril 5mg qd  Continue to monitor A1c 6.3%  Continue with FS qac and qHS  Low dose ISS  Holding home oral diabetic medications
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
## 2021-10-17 NOTE — PROGRESS NOTE ADULT - SUBJECTIVE AND OBJECTIVE BOX
Follow Up: Fevers    Interval History/ROS: Drowsy, inattentive. Just got ativan for tremors. Per RN he was alert prior.     Allergies  No Known Allergies        ANTIMICROBIALS:  cefTRIAXone   IVPB 1000 every 24 hours  vancomycin  IVPB 1000 every 12 hours      OTHER MEDS:  acetaminophen   Tablet .. 650 milliGRAM(s) Oral every 6 hours PRN  aluminum hydroxide/magnesium hydroxide/simethicone Suspension 30 milliLiter(s) Oral every 4 hours PRN  dextrose 40% Gel 15 Gram(s) Oral once  dextrose 5%. 1000 milliLiter(s) IV Continuous <Continuous>  dextrose 5%. 1000 milliLiter(s) IV Continuous <Continuous>  dextrose 50% Injectable 25 Gram(s) IV Push once  dextrose 50% Injectable 12.5 Gram(s) IV Push once  dextrose 50% Injectable 25 Gram(s) IV Push once  folic acid 1 milliGRAM(s) Oral daily  glucagon  Injectable 1 milliGRAM(s) IntraMuscular once  insulin lispro (ADMELOG) corrective regimen sliding scale   SubCutaneous three times a day before meals  lactobacillus acidophilus 1 Tablet(s) Oral two times a day with meals  lidocaine   4% Patch 1 Patch Transdermal every 24 hours  lisinopril 5 milliGRAM(s) Oral daily  LORazepam     Tablet 2 milliGRAM(s) Oral every 2 hours PRN  LORazepam   Injectable 2 milliGRAM(s) IV Push every 1 hour PRN  melatonin 3 milliGRAM(s) Oral at bedtime PRN  multivitamin 1 Tablet(s) Oral daily  ondansetron Injectable 4 milliGRAM(s) IV Push every 8 hours PRN  sodium chloride 0.9%. 1000 milliLiter(s) IV Continuous <Continuous>  tamsulosin 0.4 milliGRAM(s) Oral at bedtime  thiamine IVPB 500 milliGRAM(s) IV Intermittent daily      Vital Signs Last 24 Hrs  T(C): 36.5 (17 Oct 2021 17:48), Max: 38.8 (17 Oct 2021 14:01)  T(F): 97.7 (17 Oct 2021 17:48), Max: 101.8 (17 Oct 2021 14:01)  HR: 88 (17 Oct 2021 17:48) (78 - 113)  BP: 139/99 (17 Oct 2021 17:48) (114/99 - 139/99)  BP(mean): --  RR: 18 (17 Oct 2021 17:48) (18 - 19)  SpO2: 100% (17 Oct 2021 17:48) (97% - 100%)    Physical Exam:  General: drowsy, inattentive, non toxic, underweight   Head: atraumatic, normocephalic  Eye: normal sclera and conjunctiva  ENT: no cervical lymphadenopathy   Cardio: regular rate and rhythm   Respiratory: nonlabored on room air, clear bilaterally, no wheezing  abd: soft, bowel sounds present, no tenderness  : no CVAT, no suprapubic tenderness, no ridley  Musculoskeletal: right forearm swollen but not hot or tender, area of bruising volar aspect, no cord that I can palpate  Skin: no rash                          16.2   3.21  )-----------( 301      ( 17 Oct 2021 06:28 )             47.9       10-17    124<L>  |  84<L>  |  19  ----------------------------<  97  4.2   |  26  |  0.77    Ca    9.2      17 Oct 2021 10:15  Phos  2.9     10-17  Mg     2.00     10-17    TPro  8.2  /  Alb  4.2  /  TBili  0.7  /  DBili  x   /  AST  20  /  ALT  12  /  AlkPhos  119  10-16          MICROBIOLOGY:  Culture - Blood (collected 10-16-21 @ 10:11)  Source: .Blood Blood-Peripheral  Preliminary Report (10-17-21 @ 11:01):    No growth to date.    Culture - Urine (collected 10-15-21 @ 04:26)  Source: Clean Catch Clean Catch (Midstream)  Final Report (10-16-21 @ 06:25):    <10,000 CFU/mL Normal Urogenital Lin    Rapid RVP Result: NotDetec (10-15 @ 12:18)    RADIOLOGY:  Images below reviewed personally  CT Chest No Cont (10.15.21 @ 16:15)   1.  Lung findings are compatible with spectrum of smoking-related lung disease including findings of pulmonary Langerhans' cell histiocytosis and respiratory bronchiolitis. An acute infectious process would have to be excluded on clinical grounds.  2.  Pulmonary nodules measuring up to 0.8 cm for which repeat chest CT scan is recommended in 3 months for reassessment  3.  Extraparenchymal density in the left thorax canbe reassessed on the above recommended follow-up  4.  Multinodular thyroid which may be further characterized with ultrasonography.    Xray Pelvis AP only (10.15.21 @ 03:36)   No acute fracture or dislocation.    CT Head No Cont (10.15.21 @ 05:03)   No CT evidence of acute intracranial hemorrhage, mass effect, or midline shift. If clinical symptoms persist or worsen, more sensitive evaluation with brain MRI may be obtained, if no contraindications exist.
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
## 2021-10-17 NOTE — PROGRESS NOTE ADULT - PROBLEM SELECTOR PLAN 6
CT chest with findings of pulmonary Langerhans' cell histiocytosis and respiratory bronchiolitis, pulmonary nodules measuring up to 0.8 cm  Repeat CT chest in 3 months RUE forearm swelling noted, no erythema, no IV   VA Duplex to r/o SVT/DVT ordered

## 2021-10-17 NOTE — CHART NOTE - NSCHARTNOTEFT_GEN_A_CORE
Evening BMP shows sodium level 123. D/w nephro fellow on call. Recommends to d/c normal saline 0.9% and start hypertonic saline 1.5% at 50cc/hr x 8 hrs and recheck BMP in 8 hrs. Will monitor patient closely.

## 2021-10-17 NOTE — CONSULT NOTE ADULT - PROBLEM SELECTOR RECOMMENDATION 9
.  - Chronic, >48h, asymptomatic (sedated only now as given Ativan, but awake as per aide)  - Paty 88, UK 89, Sp grav 1.033, EFWC negative  - On exam, volume down  --- RISK FACTORS: Hypovolemic/ CSW (although doubt as rare even in meningitis)/ "Tea and toast"/ Lots of infusions being given with D5: Vanc, ceftiaxone, thiamine, ampicillin, acyclovir (he gets 650ml D5W daily)  --- PLAN:  1) Try to minimize D5W infusions. Consider thiamine PO. Ask pharmacy if saline can be substituted for D5 as piggyback  2) Trial of NS@ 60ml/hr x 1L  3) Repeat labs at 1800  4) Goal Na by mitzi AM ~128  5) Urine studies ordered .  - Chronic, >48h, asymptomatic (sedated only now as given Ativan, but awake as per aide)  - Paty 88, UK 89, Sp grav 1.033, EFWC negative  - On exam, volume down  --- RISK FACTORS: Hypovolemic/ CSW (although doubt as rare even in meningitis)/ "Tea and toast"/ Lots of infusions being given with D5: Vanc, ceftiaxone, thiamine, ampicillin, acyclovir (he gets 650ml D5W daily)  --- PLAN:  1) Try to minimize D5W infusions. Consider thiamine PO. Ask pharmacy if saline can be substituted for D5 as piggyback  2) Trial of NS@ 75ml/hr x 1L  3) Repeat labs at 1800  4) Goal Na by mitzi AM ~128  5) Urine studies ordered

## 2021-10-17 NOTE — CHART NOTE - NSCHARTNOTEFT_GEN_A_CORE
Patient seen at bedside with Dr. Mukesh Zuluaga for possible LP. Patient during positioning for LP was fidgety and not able to lay still. Unable to do procedure at bedside safely.     D/w ID Dr. Chapa- will hold off on LP for now given worsening swelling, erythema and warmth of RUE. Per RN, patient previously had an IV placed in that arm. Exam findings consistent with early cellulitis/thrombophlebitis. Will obtain CT of right arm with IV contrast for further evaluation and start vancomycin and Ceftriaxone.    If LP is still needed, would contact neuroradiology. Patient seen at bedside with Dr. Mukesh Zuluaga for possible LP. Patient during positioning for LP was fidgety and not able to lay still. Unable to do procedure at bedside safely.     D/w ID Dr. Chapa- will hold off on LP for now given worsening swelling, erythema and warmth of RUE. Per RN, patient previously had an IV placed in that arm. Exam findings consistent with early cellulitis/thrombophlebitis. Will obtain CT of right arm with IV contrast for further evaluation and start vancomycin and Ceftriaxone.    Patient also noted to have acute urinary retention, unable to straight cath by RN. Will call for urology consult. Patient likely has underlying BPH, will start on tamsulosin.    If LP is still needed, would contact neuroradiology.

## 2021-10-17 NOTE — CHART NOTE - NSCHARTNOTEFT_GEN_A_CORE
Notified by RN that pt with urinary retention and unable to urinate all day today. Bladder scan was done showed >350cc- St cath >800cc. Given pts clinical status with fevers, AMS, HypoNa will place Acosta cath to monitor strict I&Os. Acosta cath placed 16Fr using sterile technic, pt tolerated procedure well. Case d/w attending.  MRI Head pending, consent and check list in the chart. Had d/w brother in law and sister on the phone, plan of care discussed with understanding. Will continue to monitor pts clinical status closely

## 2021-10-17 NOTE — PHYSICAL THERAPY INITIAL EVALUATION ADULT - MANUAL MUSCLE TESTING RESULTS, REHAB EVAL
not formally assessed due to AMS. patient able to ankle pump, leg raise, extend knees against gravity in sitting

## 2021-10-17 NOTE — PROGRESS NOTE ADULT - PROBLEM SELECTOR PLAN 7
A1c 6.3%  Continue with FS qac and qHS  Low dose ISS  Holding home oral diabetic medications CT chest with findings of pulmonary Langerhans' cell histiocytosis and respiratory bronchiolitis, pulmonary nodules measuring up to 0.8 cm  Repeat CT chest in 3 months

## 2021-10-17 NOTE — PROGRESS NOTE ADULT - PROBLEM SELECTOR PLAN 9
DVT ppx: Lovenox  Diet: DASH, consistent carb with Ensure, nutrition eval  Dispo: PT eval, will need to coordinate with pt's family regarding safe discharge plan BP improved  C/w lisinopril 5mg qd  Continue to monitor
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
## 2021-10-18 NOTE — DIETITIAN NUTRITION RISK NOTIFICATION - TREATMENT: THE FOLLOWING DIET HAS BEEN RECOMMENDED
Diet, Dysphagia 3 Soft-Thin Liquids:   Consistent Carbohydrate {No Snacks} (CSTCHO)  DASH/TLC {Sodium & Cholesterol Restricted} (DASH)  Supplement Feeding Modality:  Oral  Ensure Enlive Servings Per Day:  1       Frequency:  Two Times a day (10-17-21 @ 13:55) [Active]

## 2021-10-18 NOTE — DIETITIAN INITIAL EVALUATION ADULT. - PROBLEM SELECTOR PLAN 6
pt reports that he does not take meds  check a1c  will keep pt npo for now, swallow eval if passes, start on consistent carb diet, monitor finger stick glucose  SS insulin for now
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
## 2021-10-18 NOTE — PROGRESS NOTE ADULT - PROBLEM SELECTOR PLAN 4
Na 124  Likely hypovolemic hyponatremia, in addition to D5 from abx yesterday  Nephrology consulted, appreciate recs  Trial of NS  Trend BMP q6h  Goal correction 6-8 mEq/24h Na 122  Likely hypovolemic hyponatremia, in addition to D5 from lots of infusions being given with D5: Vanc, ceftiaxone, thiamine, ampicillin, acyclovir (he gets 650ml D5W daily)  Nephrology consulted, recommend Minimize D5W. Will ask pharmacy if saline can be substituted for D5 as piggyback, Restart 1.5% @ 80ml/hr,  Goal Na by mitzi  as per Emily;    repeat BMP tonight   Goal correction 6-8 mEq/24h

## 2021-10-18 NOTE — CHART NOTE - NSCHARTNOTEFT_GEN_A_CORE
Evening BMP results d/w nephro fellow on call. Patient currently on hypertonic saline 1.5% at rate of 80ml/hr. Nephro recommends to continue IVF at current rate and recheck in am. Will monitor patient closely.     10-18    124<L>  |  86<L>  |  12  ----------------------------<  97  3.9   |  25  |  0.66    Ca    8.7      18 Oct 2021 19:43  Phos  1.7     10-18  Mg     1.70     10-18
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
## 2021-10-18 NOTE — DIETITIAN INITIAL EVALUATION ADULT. - PERTINENT MEDS FT
MEDICATIONS  (STANDING):  cefTRIAXone   IVPB 1000 milliGRAM(s) IV Intermittent every 24 hours  dextrose 40% Gel 15 Gram(s) Oral once  dextrose 5%. 1000 milliLiter(s) (50 mL/Hr) IV Continuous <Continuous>  dextrose 5%. 1000 milliLiter(s) (100 mL/Hr) IV Continuous <Continuous>  dextrose 50% Injectable 25 Gram(s) IV Push once  dextrose 50% Injectable 12.5 Gram(s) IV Push once  dextrose 50% Injectable 25 Gram(s) IV Push once  folic acid 1 milliGRAM(s) Oral daily  glucagon  Injectable 1 milliGRAM(s) IntraMuscular once  insulin lispro (ADMELOG) corrective regimen sliding scale   SubCutaneous three times a day before meals  lactobacillus acidophilus 1 Tablet(s) Oral two times a day with meals  lidocaine   4% Patch 1 Patch Transdermal every 24 hours  lisinopril 5 milliGRAM(s) Oral daily  multivitamin 1 Tablet(s) Oral daily  sodium chloride 1.5%. 500 milliLiter(s) (50 mL/Hr) IV Continuous <Continuous>  tamsulosin 0.4 milliGRAM(s) Oral at bedtime  thiamine IVPB 500 milliGRAM(s) IV Intermittent daily  vancomycin  IVPB 1250 milliGRAM(s) IV Intermittent every 12 hours
no
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
## 2021-10-18 NOTE — PROGRESS NOTE ADULT - SUBJECTIVE AND OBJECTIVE BOX
Follow Up:      Interval History/ROS:     Allergies  No Known Allergies        ANTIMICROBIALS:  cefTRIAXone   IVPB 1000 every 24 hours  vancomycin  IVPB 1250 every 12 hours      OTHER MEDS:  acetaminophen   Tablet .. 650 milliGRAM(s) Oral every 6 hours PRN  aluminum hydroxide/magnesium hydroxide/simethicone Suspension 30 milliLiter(s) Oral every 4 hours PRN  dextrose 40% Gel 15 Gram(s) Oral once  dextrose 5%. 1000 milliLiter(s) IV Continuous <Continuous>  dextrose 5%. 1000 milliLiter(s) IV Continuous <Continuous>  dextrose 50% Injectable 25 Gram(s) IV Push once  dextrose 50% Injectable 12.5 Gram(s) IV Push once  dextrose 50% Injectable 25 Gram(s) IV Push once  folic acid 1 milliGRAM(s) Oral daily  glucagon  Injectable 1 milliGRAM(s) IntraMuscular once  insulin lispro (ADMELOG) corrective regimen sliding scale   SubCutaneous three times a day before meals  lactobacillus acidophilus 1 Tablet(s) Oral two times a day with meals  lidocaine   4% Patch 1 Patch Transdermal every 24 hours  lisinopril 5 milliGRAM(s) Oral daily  LORazepam     Tablet 2 milliGRAM(s) Oral every 2 hours PRN  LORazepam   Injectable 2 milliGRAM(s) IV Push every 1 hour PRN  melatonin 3 milliGRAM(s) Oral at bedtime PRN  multivitamin 1 Tablet(s) Oral daily  ondansetron Injectable 4 milliGRAM(s) IV Push every 8 hours PRN  sodium chloride 1.5%. 500 milliLiter(s) IV Continuous <Continuous>  tamsulosin 0.4 milliGRAM(s) Oral at bedtime      Vital Signs Last 24 Hrs  T(C): 39.2 (18 Oct 2021 05:56), Max: 39.2 (18 Oct 2021 05:56)  T(F): 102.5 (18 Oct 2021 05:56), Max: 102.5 (18 Oct 2021 05:56)  HR: 112 (18 Oct 2021 05:54) (88 - 112)  BP: 157/82 (18 Oct 2021 05:54) (128/98 - 157/82)  BP(mean): --  RR: 18 (18 Oct 2021 05:54) (18 - 18)  SpO2: 96% (18 Oct 2021 05:54) (95% - 100%)    Physical Exam:  General: awake, alert, non toxic  Head: atraumatic, normocephalic  Eye: normal sclera and conjunctiva  ENT: no oropharyngeal lesions, no cervical lymphadenopathy   Cardio: regular rate and rhythm   Respiratory: nonlabored on room air, clear bilaterally, no wheezing  abd: soft, bowel sounds present, no tenderness  : no CVAT, no suprapubic tenderness, no ridley  Musculoskeletal: no focal joint swelling, no edema  vascular: no phlebitis   Skin: no rash  Neurologic: no focal deficit  psych: normal affect                          15.7   4.18  )-----------( 310      ( 18 Oct 2021 05:41 )             44.9       10-18    122<L>  |  84<L>  |  14  ----------------------------<  102<H>  4.0   |  23  |  0.77    Ca    9.2      18 Oct 2021 05:41  Phos  1.7     10-18  Mg     1.70     10-18            MICROBIOLOGY:  Vancomycin Level, Trough: 9.2 ug/mL (10-18-21 @ 05:41)    Culture - Blood (collected 10-16-21 @ 21:09)  Source: .Blood Blood-Peripheral  Preliminary Report (10-17-21 @ 22:01):    No growth to date.    Culture - Blood (collected 10-16-21 @ 10:11)  Source: .Blood Blood-Peripheral  Preliminary Report (10-17-21 @ 11:01):    No growth to date.    Culture - Urine (collected 10-15-21 @ 04:26)  Source: Clean Catch Clean Catch (Midstream)  Final Report (10-16-21 @ 06:25):    <10,000 CFU/mL Normal Urogenital Lin    Rapid RVP Result: NotDetec (10-15 @ 12:18)    RADIOLOGY:  Images below reviewed personally  CT Chest No Cont (10.15.21 @ 16:15)   1.  Lung findings are compatible with spectrum of smoking-related lung disease including findings of pulmonary Langerhans' cell histiocytosis and respiratory bronchiolitis. An acute infectious process would have to be excluded on clinical grounds.  2.  Pulmonary nodules measuring up to 0.8 cm for which repeat chest CT scan is recommended in 3 months for reassessment  3.  Extraparenchymal density in the left thorax canbe reassessed on the above recommended follow-up  4.  Multinodular thyroid which may be further characterized with ultrasonography.    Xray Pelvis AP only (10.15.21 @ 03:36)   No acute fracture or dislocation.    CT Head No Cont (10.15.21 @ 05:03)   No CT evidence of acute intracranial hemorrhage, mass effect, or midline shift. If clinical symptoms persist or worsen, more sensitive evaluation with brain MRI may be obtained, if no contraindications exist.

## 2021-10-18 NOTE — PROGRESS NOTE ADULT - PROBLEM SELECTOR PLAN 2
Febrile to 100.7 on 10/15  F/u blood cultures  UA neg on admission, COVID neg  With AMS, concern for meningitis/encephalitis  Will empirically start vancomycin, acyclovir, ampicillin and CTX  ID consult  Will attempt LP on 10/17 if patient able to cooperate, please hold DVT ppx  Check CSF protein, glucose, gram stain, PCR panel Febrile to 100.7 on 10/15, febrile to 102f today   F/u blood cultures  UA neg on admission, COVID neg  With AMS, concern for meningitis/encephalitis   c/w empirically started vancomycin and CTX  ID f/u appreciated   , plan for IR guided LP as  patient unable to cooperate for bedside LP  please hold DVT ppx  Check CSF  protein, glucose, cell count, culture, PCR panel, HSV PCR, Enterovirus PCR, [place] IgG/IgM   -CT abdomen and pelvis with IV contrast to rule out occult focus , US of RT arm   -f/u CPK level and treponemal antibody

## 2021-10-18 NOTE — PROGRESS NOTE ADULT - PROBLEM SELECTOR PLAN 1
.  - Chronic, >48h, asymptomatic (sedated only now as given Ativan, but awake as per aide)  - Paty 88, UK 89, Sp grav 1.033, EFWC negative  - On exam, volume down  --- RISK FACTORS: Hypovolemic/ CSW (although doubt as rare even in meningitis)/ "Tea and toast"/ Lots of infusions being given with D5: Vanc, ceftiaxone, thiamine, ampicillin, acyclovir (he gets 650ml D5W daily)  --- PLAN:  1) Minimize D5W. Ask pharmacy if saline can be substituted for D5 as piggyback  2) Restart 1.5% @ 80ml/hr (apparently during labs this AM, was only given an hour of 1.5%)  3) Goal Na by mitzi   4) Repeat Na 1800 today

## 2021-10-18 NOTE — DIETITIAN INITIAL EVALUATION ADULT. - PERTINENT LABORATORY DATA
10-18 Na 122 mmol/L<L> Glu 102 mg/dL<H> K+ 4.0 mmol/L Cr 0.77 mg/dL BUN 14 mg/dL Phos 1.7 mg/dL<L> A1C with Estimated Average Glucose Result: 6.3 % (10-16-21 @ 07:53)   10-18 @ 08:43 POCT 122 mg/dL  10-17 @ 22:24 POCT 131 mg/dL  10-17 @ 17:01 POCT 126 mg/dL  10-17 @ 12:07 POCT 126 mg/dL

## 2021-10-19 NOTE — PROGRESS NOTE ADULT - PROBLEM SELECTOR PLAN 2
Febrile to 100.7 on 10/15, febrile to 102f today   F/u blood cultures  UA neg on admission, COVID neg  With AMS, concern for meningitis/encephalitis   c/w empirically started vancomycin and CTX  ID f/u appreciated   , plan for IR guided LP as  patient unable to cooperate for bedside LP  please hold DVT ppx  Check CSF  protein, glucose, cell count, culture, PCR panel, HSV PCR, Enterovirus PCR, [place] IgG/IgM   -CT abdomen and pelvis with IV contrast to rule out occult focus , US of RT arm   -f/u CPK level and treponemal antibody Febrile to 100.7 on 10/15, febrile to 101.9 f today   F/u blood cultures, NGTD   UA neg on admission, COVID neg  With AMS, concern for meningitis/encephalitis   c/w empirically started vancomycin and CTX  ID f/u appreciated   , plan for IR guided LP as  patient unable to cooperate for bedside LP  please hold DVT ppx  Check CSF  protein, glucose, cell count, culture, PCR panel, HSV PCR, Enterovirus PCR, [place] IgG/IgM   -CT abdomen and pelvis with IV contrast to rule out occult focus , US of RT arm   CPK level wnl  and treponemal antibody negative   - ID also recommend Ct A/p and CT RUE, will check CT A/P ANd US of RUE as CT cannot be done at same time as CT A/P as per radiology, To avoid contrast load, will check US, if abnormal and no source found  will consider CT RUE
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
## 2021-10-19 NOTE — PROGRESS NOTE ADULT - PROBLEM SELECTOR PLAN 4
Na 122  Likely hypovolemic hyponatremia, in addition to D5 from lots of infusions being given with D5: Vanc, ceftiaxone, thiamine, ampicillin, acyclovir (he gets 650ml D5W daily)  Nephrology consulted, recommend Minimize D5W. Will ask pharmacy if saline can be substituted for D5 as piggyback, Restart 1.5% @ 80ml/hr,  Goal Na by mitzi  as per Emily;    repeat BMP tonight   Goal correction 6-8 mEq/24h Na 122  Likely hypovolemic hyponatremia, in addition to D5 from lots of infusions being given with D5: Vanc, ceftiaxone, thiamine, ampicillin, acyclovir (he gets 650ml D5W daily)  Nephrology consulted, recommend Minimize D5W. Will ask pharmacy if saline can be substituted for D5 as piggyback, Nephrology recommend  switch 1.5% to 2% saline at 60 cc/hr. Start on oral salt tabs 1g PO TID. Monitor sodium q8 hours. Fluid restriction <1L per day. Sodium should not overcorrect, should not correct by more than 6-8 meq over 24 hour period. Goal sodium tomorrow morning is 128.
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
## 2021-10-19 NOTE — PROGRESS NOTE ADULT - SUBJECTIVE AND OBJECTIVE BOX
Follow Up:      Interval History/ROS:     Allergies  No Known Allergies        ANTIMICROBIALS:  cefTRIAXone   IVPB 1000 every 24 hours  vancomycin  IVPB 1250 every 12 hours      OTHER MEDS:  acetaminophen   Tablet .. 650 milliGRAM(s) Oral every 6 hours PRN  aluminum hydroxide/magnesium hydroxide/simethicone Suspension 30 milliLiter(s) Oral every 4 hours PRN  dextrose 40% Gel 15 Gram(s) Oral once  dextrose 5%. 1000 milliLiter(s) IV Continuous <Continuous>  dextrose 5%. 1000 milliLiter(s) IV Continuous <Continuous>  dextrose 50% Injectable 25 Gram(s) IV Push once  dextrose 50% Injectable 12.5 Gram(s) IV Push once  dextrose 50% Injectable 25 Gram(s) IV Push once  folic acid 1 milliGRAM(s) Oral daily  glucagon  Injectable 1 milliGRAM(s) IntraMuscular once  insulin lispro (ADMELOG) corrective regimen sliding scale   SubCutaneous three times a day before meals  lactobacillus acidophilus 1 Tablet(s) Oral two times a day with meals  lidocaine   4% Patch 1 Patch Transdermal every 24 hours  lisinopril 5 milliGRAM(s) Oral daily  LORazepam     Tablet 2 milliGRAM(s) Oral every 2 hours PRN  LORazepam   Injectable 2 milliGRAM(s) IV Push every 1 hour PRN  melatonin 3 milliGRAM(s) Oral at bedtime PRN  multivitamin 1 Tablet(s) Oral daily  ondansetron Injectable 4 milliGRAM(s) IV Push every 8 hours PRN  potassium phosphate / sodium phosphate Powder (PHOS-NaK) 1 Packet(s) Oral two times a day with meals  sodium chloride 1 Gram(s) Oral three times a day  Sodium chloride  2% 60 mL/Hr   IV Continuous <Continuous>  tamsulosin 0.4 milliGRAM(s) Oral at bedtime      Vital Signs Last 24 Hrs  T(C): 36.1 (19 Oct 2021 08:25), Max: 38.8 (19 Oct 2021 05:16)  T(F): 97 (19 Oct 2021 08:25), Max: 101.9 (19 Oct 2021 05:16)  HR: 77 (19 Oct 2021 10:00) (77 - 108)  BP: 148/98 (19 Oct 2021 10:00) (148/98 - 165/96)  BP(mean): --  RR: 18 (19 Oct 2021 10:00) (18 - 19)  SpO2: 100% (19 Oct 2021 10:00) (98% - 100%)    Physical Exam:  General: awake, alert, non toxic  Head: atraumatic, normocephalic  Eye: normal sclera and conjunctiva  ENT: no oropharyngeal lesions, no cervical lymphadenopathy   Cardio: regular rate and rhythm   Respiratory: nonlabored on room air, clear bilaterally, no wheezing  abd: soft, bowel sounds present, no tenderness  : no CVAT, no suprapubic tenderness, no ridley  Musculoskeletal: no focal joint swelling, no edema  vascular: no phlebitis   Skin: no rash  Neurologic: no focal deficit  psych: normal affect                          13.7   5.38  )-----------( 298      ( 19 Oct 2021 07:47 )             39.4       10-19    122<L>  |  84<L>  |  13  ----------------------------<  94  4.0   |  21<L>  |  0.68    Ca    9.1      19 Oct 2021 05:38  Phos  2.3     10-19  Mg     1.80     10-19        Urinalysis Basic - ( 18 Oct 2021 17:15 )    Color: Yellow / Appearance: Clear / S.024 / pH: x  Gluc: x / Ketone: Small  / Bili: Negative / Urobili: 3 mg/dL   Blood: x / Protein: 30 mg/dL / Nitrite: Negative   Leuk Esterase: Negative / RBC: 5 /HPF / WBC 4 /HPF   Sq Epi: x / Non Sq Epi: 1 /HPF / Bacteria: Few        MICROBIOLOGY:  v    Rapid RVP Result: [employer] (10-15 @ 12:18)        RADIOLOGY:  Images below reviewed personally   Follow Up:  Fevers    Interval History/ROS: Awake and alert today. Lower back pain. Nothing else hurts. Has been losing weight for about 2 months unintentionally and having chills for about 1 month. Thought he had cancer and took vitamins, fruits and vegetables to try to cure himself. Did not see a doctor and doesn't have a PMD. Hasn't worked in many years. Lives with his parents whom he takes care of.     Allergies  No Known Allergies        ANTIMICROBIALS:  cefTRIAXone   IVPB 1000 every 24 hours  vancomycin  IVPB 1250 every 12 hours      OTHER MEDS:  acetaminophen   Tablet .. 650 milliGRAM(s) Oral every 6 hours PRN  aluminum hydroxide/magnesium hydroxide/simethicone Suspension 30 milliLiter(s) Oral every 4 hours PRN  dextrose 40% Gel 15 Gram(s) Oral once  dextrose 5%. 1000 milliLiter(s) IV Continuous <Continuous>  dextrose 5%. 1000 milliLiter(s) IV Continuous <Continuous>  dextrose 50% Injectable 25 Gram(s) IV Push once  dextrose 50% Injectable 12.5 Gram(s) IV Push once  dextrose 50% Injectable 25 Gram(s) IV Push once  folic acid 1 milliGRAM(s) Oral daily  glucagon  Injectable 1 milliGRAM(s) IntraMuscular once  insulin lispro (ADMELOG) corrective regimen sliding scale   SubCutaneous three times a day before meals  lactobacillus acidophilus 1 Tablet(s) Oral two times a day with meals  lidocaine   4% Patch 1 Patch Transdermal every 24 hours  lisinopril 5 milliGRAM(s) Oral daily  LORazepam     Tablet 2 milliGRAM(s) Oral every 2 hours PRN  LORazepam   Injectable 2 milliGRAM(s) IV Push every 1 hour PRN  melatonin 3 milliGRAM(s) Oral at bedtime PRN  multivitamin 1 Tablet(s) Oral daily  ondansetron Injectable 4 milliGRAM(s) IV Push every 8 hours PRN  potassium phosphate / sodium phosphate Powder (PHOS-NaK) 1 Packet(s) Oral two times a day with meals  sodium chloride 1 Gram(s) Oral three times a day  Sodium chloride  2% 60 mL/Hr   IV Continuous <Continuous>  tamsulosin 0.4 milliGRAM(s) Oral at bedtime      Vital Signs Last 24 Hrs  T(C): 36.1 (19 Oct 2021 08:25), Max: 38.8 (19 Oct 2021 05:16)  T(F): 97 (19 Oct 2021 08:25), Max: 101.9 (19 Oct 2021 05:16)  HR: 77 (19 Oct 2021 10:00) (77 - 108)  BP: 148/98 (19 Oct 2021 10:00) (148/98 - 165/96)  BP(mean): --  RR: 18 (19 Oct 2021 10:00) (18 - 19)  SpO2: 100% (19 Oct 2021 10:00) (98% - 100%)    Physical Exam:  General: awake, alert, non toxic, cachectic   Head: atraumatic, normocephalic  Eye: normal sclera and conjunctiva  ENT: no cervical lymphadenopathy   Cardio: regular rate   Respiratory: nonlabored on room air, clear bilaterally, no wheezing  abd: soft, bowel sounds present, no tenderness  : no CVAT, no suprapubic tenderness, no ridley  Musculoskeletal: no focal joint swelling, no edema. right forearm slightly swollen, nontender, not inflamed   vascular: no phlebitis   Skin: no rash  Neurologic: tremors  psych: withdrawn but polite, speaks very quietly                           13.7   5.38  )-----------( 298      ( 19 Oct 2021 07:47 )             39.4       10-19    122<L>  |  84<L>  |  13  ----------------------------<  94  4.0   |  21<L>  |  0.68    Ca    9.1      19 Oct 2021 05:38  Phos  2.3     10-19  Mg     1.80     10-19        Urinalysis Basic - ( 18 Oct 2021 17:15 )    Color: Yellow / Appearance: Clear / S.024 / pH: x  Gluc: x / Ketone: Small  / Bili: Negative / Urobili: 3 mg/dL   Blood: x / Protein: 30 mg/dL / Nitrite: Negative   Leuk Esterase: Negative / RBC: 5 /HPF / WBC 4 /HPF   Sq Epi: x / Non Sq Epi: 1 /HPF / Bacteria: Few        MICROBIOLOGY:  Culture - Blood (collected 10-18-21 @ 12:05)  Source: .Blood Blood-Venous  Preliminary Report (10-19-21 @ 13:02):    No growth to date.    Culture - Blood (collected 10-18-21 @ 12:04)  Source: .Blood Blood-Peripheral  Preliminary Report (10-19-21 @ 13:02):    No growth to date.    Culture - Blood (collected 10-16-21 @ 21:09)  Source: .Blood Blood-Peripheral  Preliminary Report (10-17-21 @ 22:01):    No growth to date.    Culture - Blood (collected 10-16-21 @ 10:11)  Source: .Blood Blood-Peripheral  Preliminary Report (10-17-21 @ 11:01):    No growth to date.    Culture - Urine (collected 10-15-21 @ 04:26)  Source: Clean Catch Clean Catch (Midstream)  Final Report (10-16-21 @ 06:25):    <10,000 CFU/mL Normal Urogenital Lin    Rapid RVP Result: [employer] (10-15 @ 12:18)    HIV-1/2 Antigen/Antibody Screen by [facility] (10.17.21 @ 06:28) Nonreact    Syphilis Screen (10.19.21 @ 11:15) Negative    RADIOLOGY:  Images below reviewed personally  CT Chest No Cont (10.15.21 @ 16:15)   1.  Lung findings are compatible with spectrum of smoking-related lung disease including findings of pulmonary Langerhans' cell histiocytosis and respiratory bronchiolitis. An acute infectious process would have to be excluded on clinical grounds.  2.  Pulmonary nodules measuring up to 0.8 cm for which repeat chest CT scan is recommended in 3 months for reassessment  3.  Extraparenchymal density in the left thorax can be reassessed on the above recommended follow-up  4.  Multinodular thyroid which may be further characterized with ultrasonography.    Xray Pelvis AP only (10.15.21 @ 03:36)   No acute fracture or dislocation.    CT Head No Cont (10.15.21 @ 05:03)   No CT evidence of acute intracranial hemorrhage, mass effect, or midline shift. If clinical symptoms persist or worsen, more sensitive evaluation with brain MRI may be obtained, if no contraindications exist.

## 2021-10-19 NOTE — PROGRESS NOTE ADULT - PROBLEM SELECTOR PLAN 1
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 Noted to have sodium of 127 on 10/15/21 at 4 am, then Na was 122 on 10/15/21 6 pm, increased to 128 on 10/16/21 at 8 am, then was 128 on 10/16/21, and then dropped to 120 on 10/17/21. Started on 1.5% on 10/17/21. Last Na this morning is 122. Urine Osm significantly elevated at 757 and urine Na of 130 on 10/18/21. Currently on 1.5% at 80 cc/hr.    Recommend to switch 1.5% to 2% saline at 60 cc/hr. Start on salt tabs 1g PO TID. Monitor sodium q8 hours. Fluid restriction <1L per day. Sodium should not overcorrect, should not correct by more than 6-8 meq over 24 hour period. Goal sodium tomorrow morning is 128.    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 Noted to have sodium of 127 on 10/15/21 at 4 am, then Na was 122 on 10/15/21 6 pm, increased to 128 on 10/16/21 at 8 am, then was 128 on 10/16/21, and then dropped to 120 on 10/17/21. Started on 1.5% on 10/17/21. Last Na this morning is 122. Urine Osm significantly elevated at 757 and urine Na of 130 on 10/18/21. Currently on 1.5% at 80 cc/hr.    Recommend to switch 1.5% to 2% saline at 60 cc/hr. Start on oral salt tabs 1g PO TID. Monitor sodium q8 hours. Fluid restriction <1L per day. Sodium should not overcorrect, should not correct by more than 6-8 meq over 24 hour period. Goal sodium tomorrow morning is 128.    If any questions, please feel free to contact me     Sussy Key  Nephrology Fellow  [facility] Pager: [phone]  [facility] Pager: [phone]

## 2021-10-19 NOTE — CHART NOTE - NSCHARTNOTEFT_GEN_A_CORE
PRE-INTERVENTIONAL RADIOLOGY PROCEDURE NOTE      Patient Age: 62 yo    Patient Gender: Male    Procedure: LP    Diagnosis/Indication: Fever, Acute AMS    Interventional Radiology Attending Physician: Dr Farrar    Ordering Attending Physician: Dr Sandoval    Pertinent Medical History: DM, HTN    Pertinent labs:                      13.7   5.38  )-----------( 298      ( 19 Oct 2021 07:47 )             39.4       10-19    122<L>  |  84<L>  |  13  ----------------------------<  94  4.0   |  21<L>  |  0.68    Ca    9.1      19 Oct 2021 05:38  Phos  2.3     10-19  Mg     1.80     10-19        PT/INR - ( 19 Oct 2021 05:38 )   PT: 14.4 sec;   INR: 1.28 ratio                 Patient and Family Aware ? Yes
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
## 2021-10-19 NOTE — PROGRESS NOTE ADULT - ASSESSMENT
Pt with hypotonic hyponatremia likely in setting of SIADH Pt with hypotonic hyponatremia likely in setting of SIADH.

## 2021-10-20 NOTE — PROGRESS NOTE ADULT - PROBLEM SELECTOR PLAN 7
CT chest with findings of pulmonary Langerhans' cell histiocytosis and respiratory bronchiolitis, pulmonary nodules measuring up to 0.8 cm   case d/w ID, recommending Pulmonary eval   Pulmonary eval requested, case discussed, will f/u recs     # Thyroid Nodule: Solid right thyroid nodule measuring up to 1.7 cm. Sonographic follow-up is recommended.  TI-RAD 3: Mildly suspicious (FNA if > 2.5 cm, Follow if > 1.5 cm) will recommend f/u in 3 months    TSH wnl, will check Ft4

## 2021-10-20 NOTE — PROGRESS NOTE ADULT - PROBLEM SELECTOR PLAN 4
Na 129 today   Likely hypovolemic hyponatremia, in addition to D5 from lots of infusions being given with D5: Vanc, ceftiaxone, thiamine, ampicillin, acyclovir (he gets 650ml D5W daily)  Nephrology consulted, recommend Minimize D5W.  Start on oral salt tabs 1g PO TID. c/w Fluid restriction <1L per day.    will continue to monitor
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
## 2021-10-20 NOTE — PROGRESS NOTE ADULT - SUBJECTIVE AND OBJECTIVE BOX
Follow Up:  fever    Interval History/ROS:  somnolent    Allergies  No Known Allergies    ANTIMICROBIALS:  cefTRIAXone   IVPB 1000 every 24 hours    OTHER MEDS:  MEDICATIONS  (STANDING):  acetaminophen   Tablet .. 650 every 6 hours PRN  aluminum hydroxide/magnesium hydroxide/simethicone Suspension 30 every 4 hours PRN  dextrose 40% Gel 15 once  dextrose 50% Injectable 25 once  dextrose 50% Injectable 12.5 once  dextrose 50% Injectable 25 once  glucagon  Injectable 1 once  insulin lispro (ADMELOG) corrective regimen sliding scale  three times a day before meals  lisinopril 5 daily  LORazepam     Tablet 2 every 2 hours PRN  LORazepam   Injectable 2 every 1 hour PRN  melatonin 3 at bedtime PRN  ondansetron Injectable 4 every 8 hours PRN  tamsulosin 0.4 at bedtime      Vital Signs Last 24 Hrs  T(C): 36.6 (20 Oct 2021 14:04), Max: 39.2 (20 Oct 2021 10:04)  T(F): 97.8 (20 Oct 2021 14:04), Max: 102.6 (20 Oct 2021 10:04)  HR: 90 (20 Oct 2021 14:04) (89 - 127)  BP: 143/94 (20 Oct 2021 14:04) (136/91 - 163/94)  BP(mean): --  RR: 19 (20 Oct 2021 14:04) (18 - 22)  SpO2: 98% (20 Oct 2021 14:04) (95% - 100%)    PHYSICAL EXAM:  General: thin NAD, Non-toxic  Neurology: unresponsive  Respiratory: Clear to auscultation bilaterally  CV: RRR, S1S2, no murmurs, rubs or gallops  Abdominal: Soft, Non-tender, non-distended  Extremities: No edema  Line Sites: Clear  Skin: No rash                        17.0   5.26  )-----------( 287      ( 20 Oct 2021 08:08 )             49.6       10-20    129<L>  |  88<L>  |  18  ----------------------------<  93  4.0   |  23  |  0.70    Ca    9.6      20 Oct 2021 08:08  Phos  2.7     10-20  Mg     2.10     10-20    TPro  8.0  /  Alb  3.9  /  TBili  0.6  /  DBili  x   /  AST  17  /  ALT  10  /  AlkPhos  108  10-20    10.20.21 @ 08:08)  Procalcitonin, Serum: 0.24:      MICROBIOLOGY:  .Blood Blood-Venous  10-18-21   No growth to date.  --  --      .Blood Blood-Peripheral  10-18-21   No growth to date.  --  --      .Blood Blood-Peripheral  10-16-21   No growth to date.  --  --      .Blood Blood-Peripheral  10-16-21   No growth to date.  --  --      Clean Catch Clean Catch (Midstream)  10-15-21   <10,000 CFU/mL Normal Urogenital Lin  --  --    Rapid RVP Result: NotDetec (10-15 @ 12:18)        RADIOLOGY:  < from: US Thyroid + Parathyroid (10.16.21 @ 13:28) >  Solid right thyroid nodule measuring up to 1.7 cm. Sonographic follow-up is recommended.    < end of copied text >  < from: CT Chest No Cont (10.15.21 @ 16:15) >  1.  Lung findings are compatible with spectrum of smoking-related lung disease including findings of pulmonary Langerhans' cell histiocytosis and respiratory bronchiolitis. An acute infectious process would have to be excluded on clinical grounds.    2.  Pulmonary nodules measuring up to 0.8 cm for which repeat chest CT scan is recommended in 3 months for reassessment    3.  Extraparenchymal density in the left thorax canbe reassessed on the above recommended follow-up    4.  Multinodular thyroid which may be further characterized with ultrasonography.    < end of copied text >      Nir Trimble MD; Division of Infectious Disease; Pager: [phone]; nights and weekends: [phone]

## 2021-10-20 NOTE — PROGRESS NOTE ADULT - PROBLEM SELECTOR PLAN 1
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 Noted to have sodium of 127 on 10/15/21 at 4 am, then Na was 122 on 10/15/21 6 pm, increased to 128 on 10/16/21 at 8 am, then was 128 on 10/16/21, and then dropped to 120 on 10/17/21. Started on 1.5% on 10/17/21. Last Na this morning is 122. Urine Osm significantly elevated at 757 and urine Na of 130 on 10/18/21.  Started on 1.5% on 10/17/21. Then switched to 2% and started on salt tabs 1gPO TID on 10/19/21. Last Na improved to 129 this morning.     Continue oral salt tabs 1g PO TID. No plan for hypertonic saline. Monitor sodium q12 hours. Fluid restriction <1L per day. Monitor Na level.     If any questions, please feel free to contact me     Sussy Key  Nephrology Fellow  [facility] Pager: [phone]  [facility] Pager: [phone] Pt. with hypotonic hyponatremia. Pt. with clinical and lab findings suggestive of SIADH. Pt. received IV HTS and oral salt tablets. SNa improved to 129 today. Continue with fluid restriction and oral salt tabs 1g PO TID. Monitor SNa daily.     Will discontinue follow-up. Reconsult as needed.     Sussy Key  Nephrology Fellow  [facility] Pager: [phone]  [facility] Pager: [phone]
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
## 2021-10-20 NOTE — PROGRESS NOTE ADULT - PROBLEM SELECTOR PLAN 2
pt persistently febrile   F/u blood cultures, NGTD   UA neg on admission, COVID neg  With AMS, concern for meningitis/encephalitis  on empirically started vancomycin and CTX  ID f/u appreciated , vanco dced as probably not helping, , procalcitonin mildly elevated, f/u quant gold   , plan for IR guided LP as  patient unable to cooperate for bedside LP  please hold DVT ppx  Check CSF  protein, glucose, cell count, culture, PCR panel, HSV PCR, Enterovirus PCR, [place] IgG/IgM   -CT abdomen and pelvis with IV contrast to rule out occult focus , US of RT arm   CPK level wnl  and treponemal antibody negative   - ID also recommend Ct A/p and CT RUE, will check CT A/P ANd US of RUE as CT cannot be done at same time as CT A/P as per radiology, To avoid contrast load, will check US, if abnormal and no source found  will consider CT RUE

## 2021-10-21 NOTE — PROGRESS NOTE ADULT - PROBLEM SELECTOR PLAN 7
CT chest with findings of pulmonary Langerhans' cell histiocytosis and respiratory bronchiolitis, pulmonary nodules measuring up to 0.8 cm   case d/w ID, recommending Pulmonary eval   Pulmonary eval  appreciated, plan as above     # Thyroid Nodule: Solid right thyroid nodule measuring up to 1.7 cm. Sonographic follow-up is recommended.  TI-RAD 3: Mildly suspicious (FNA if > 2.5 cm, Follow if > 1.5 cm) will recommend f/u in 3 months    TSH and FT4 wnl,
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
## 2021-10-21 NOTE — PROGRESS NOTE ADULT - SUBJECTIVE AND OBJECTIVE BOX
Follow Up: FUO    Interval History/ROS: CSF suggests meningitis. Awake. Feels ok but still with chills. Back sore. Some headache still. No diarrhea. No cough.   Has never lived or traveled outside the [place]. Lived in [place] before.     Allergies  No Known Allergies        ANTIMICROBIALS:  cefTRIAXone   IVPB 1000 every 24 hours      OTHER MEDS:  acetaminophen   Tablet .. 650 milliGRAM(s) Oral every 6 hours PRN  aluminum hydroxide/magnesium hydroxide/simethicone Suspension 30 milliLiter(s) Oral every 4 hours PRN  dextrose 40% Gel 15 Gram(s) Oral once  dextrose 5%. 1000 milliLiter(s) IV Continuous <Continuous>  dextrose 5%. 1000 milliLiter(s) IV Continuous <Continuous>  dextrose 50% Injectable 25 Gram(s) IV Push once  dextrose 50% Injectable 12.5 Gram(s) IV Push once  dextrose 50% Injectable 25 Gram(s) IV Push once  folic acid 1 milliGRAM(s) Oral daily  glucagon  Injectable 1 milliGRAM(s) IntraMuscular once  insulin lispro (ADMELOG) corrective regimen sliding scale   SubCutaneous three times a day before meals  lactobacillus acidophilus 1 Tablet(s) Oral two times a day with meals  lidocaine   4% Patch 1 Patch Transdermal every 24 hours  lisinopril 5 milliGRAM(s) Oral daily  LORazepam     Tablet 2 milliGRAM(s) Oral every 2 hours PRN  LORazepam   Injectable 2 milliGRAM(s) IV Push every 1 hour PRN  melatonin 3 milliGRAM(s) Oral at bedtime PRN  multivitamin 1 Tablet(s) Oral daily  ondansetron Injectable 4 milliGRAM(s) IV Push every 8 hours PRN  sodium chloride 1 Gram(s) Oral three times a day  Sodium chloride  2% 60 mL/Hr,Sodium Chloride 2% 500 milliLiter(s) 500 milliLiter(s) IV Continuous <Continuous>  tamsulosin 0.4 milliGRAM(s) Oral at bedtime      Vital Signs Last 24 Hrs  T(C): 39 (21 Oct 2021 15:18), Max: 39.2 (21 Oct 2021 06:13)  T(F): 102.2 (21 Oct 2021 15:18), Max: 102.5 (21 Oct 2021 06:13)  HR: 97 (21 Oct 2021 11:27) (97 - 122)  BP: 130/86 (21 Oct 2021 11:27) (130/86 - 152/95)  BP(mean): --  RR: 18 (21 Oct 2021 11:27) (18 - 20)  SpO2: 99% (21 Oct 2021 11:27) (97% - 100%)    Physical Exam:  General: cachectic, awake, alert, non toxic   Head: atraumatic, normocephalic  Eye: normal sclera and conjunctiva  Cardio: tachycardic   Respiratory: nonlabored on room air, clear bilaterally, no wheezing  abd: soft, bowel sounds present, no tenderness  : no CVAT, no suprapubic tenderness, no ridley  Musculoskeletal: no focal joint swelling, no edema  vascular: no phlebitis   Skin: no rash  Neurologic: tremors  psych: polite. flat affect                          14.1   5.73  )-----------( 292      ( 21 Oct 2021 07:49 )             40.1       10-21    129<L>  |  89<L>  |  20  ----------------------------<  124<H>  3.7   |  25  |  0.72    Ca    8.9      21 Oct 2021 07:49  Phos  2.1     10-21  Mg     1.90     10-21    TPro  6.8  /  Alb  3.3  /  TBili  0.6  /  DBili  x   /  AST  22  /  ALT  9   /  AlkPhos  96  10-21          MICROBIOLOGY:  Culture - Acid Fast - CSF (collected 10-20-21 @ 19:11)  Source: .CSF CSF    Culture - CSF with Gram Stain (collected 10-20-21 @ 19:11)  Source: .CSF CSF  Gram Stain (10-20-21 @ 19:42):    polymorphonuclear leukocytes seen    No organisms seen    by cytocentrifuge  Preliminary Report (10-21-21 @ 15:55):    No growth to date.    Culture - Blood (collected 10-18-21 @ 12:05)  Source: .Blood Blood-Venous  Preliminary Report (10-19-21 @ 13:02):    No growth to date.    Culture - Blood (collected 10-18-21 @ 12:04)  Source: .Blood Blood-Peripheral  Preliminary Report (10-19-21 @ 13:02):    No growth to date.    Culture - Blood (collected 10-16-21 @ 21:09)  Source: .Blood Blood-Peripheral  Preliminary Report (10-17-21 @ 22:01):    No growth to date.    Culture - Blood (collected 10-16-21 @ 07:10)  Source: .Blood Blood-Peripheral  Final Report (10-21-21 @ 11:00):    No Growth Final    Culture - Urine (collected 10-15-21 @ 04:26)  Source: Clean Catch Clean Catch (Midstream)  Final Report (10-16-21 @ 06:25):    <10,000 CFU/mL Normal Urogenital Lin    HSV 1/2 PCR: NotDetec (10-21 @ 01:36)    Rapid RVP Result: NotDetec (10-15 @ 12:18)    RADIOLOGY:  Images below reviewed personally  CT Abdomen and Pelvis w/ IV Cont (10.20.21 @ 21:32)   No acute intra-abdominal or pelvic pathology.    CT Chest No Cont (10.15.21 @ 16:15)   1.  Lung findings are compatible with spectrum of smoking-related lung disease including findings of pulmonary Langerhans' cell histiocytosis and respiratory bronchiolitis. An acute infectious process would have to be excluded on clinical grounds.  2.  Pulmonary nodules measuring up to 0.8 cm for which repeat chest CT scan is recommended in 3 months for reassessment  3.  Extraparenchymal density in the left thorax can be reassessed on the above recommended follow-up  4.  Multinodular thyroid which may be further characterized with ultrasonography.    CT Head No Cont (10.15.21 @ 05:03)   No CT evidence of acute intracranial hemorrhage, mass effect, or midline shift. If clinical symptoms persist or worsen, more sensitive evaluation with brain MRI may be obtained, if no contraindications exist.

## 2021-10-21 NOTE — PROVIDER CONTACT NOTE (OTHER) - SITUATION
Patient felt warm during assessment, doing [id number], unable to obtain oral temp, did a rectal temp. which was 102.6F.
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
## 2021-10-21 NOTE — PROVIDER CONTACT NOTE (OTHER) - ACTION/TREATMENT ORDERED:
Tylenol given and PA made aware. Will continue to reassess temp. Tylenol given and ACP made aware. Will continue to reassess temp.

## 2021-10-21 NOTE — PROGRESS NOTE ADULT - PROBLEM SELECTOR PLAN 2
pt persistently febrile   F/u blood cultures, NGTD   UA neg on admission, COVID neg  With AMS, concern for meningitis/encephalitis  on empirically started vancomycin and CTX  ID f/u appreciated , vanco dced as probably not helping, , procalcitonin mildly elevated, f/u quant gold   s/p IR guided LP, results noted, glucose 14, protein >200 in CSF    - will f/u culture, PCR panel, HSV PCR, Enterovirus PCR, [place] IgG/IgM   -CT abdomen and pelvis with IV contrast unremarkablr  , US of RT arm   CPK level wnl  and treponemal antibody negative   -Pulmonary eval appreciated, findings in CXR consistent with sarcoidosis in the upper lobes, stable from previous imaging in 2016, Given LP findings (lymphocytic predominance, high protein, and negative cultures), 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

## 2021-10-21 NOTE — CHART NOTE - NSCHARTNOTEFT_GEN_A_CORE
Informed by attending and Neurology team that since pt was unable to tolerate MRI this morning, we will Need to re-attempt with anesthesia.  above was discussed with MRI Supervisor ( José Miguel), Unclear about the timing at this point.   Anesthesia consult ordered, Awaiting call back , will Make pt NPO P MN just in case.
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
## 2021-10-22 NOTE — CHART NOTE - NSCHARTNOTEFT_GEN_A_CORE
Patient seen and examined at the bedside this AM.  Continues to have tremors.  Following commands and responding appropriately.  Lozada reflexes present. Hyperreflexia noted in biceps bilaterally.     Rest of exam as per consult note and pending attending attestation.     At this time recommending:  [] MRI w/ and w/o contrast to investigate for any meningeal enhancement/inflammation  [] repeat LP with studies looking for glucose, protein, cell count, PCR, gram stain, fungal culture, AFB (tuberculosis), cryptococcal, CSF ACE, flow cytometry, cytology, chloride, VDRL, oligoclonal bands, lactic acid, LDH, EBV, CMV, IgG index. Would highly encourage to obtain at least 25cc of CSF as AFB test is necessary.   [] appreciate medicine, ID, pulmonology, nephrology input    Rest of recommendations will come as per attending attestation in consult note. Patient seen and examined at the bedside this AM.  Continues to have tremors.  Following commands mostly but occasionally needs repeated prompting to properly perform and responding mostly appropriately with mild-mod psychomotor slowing.   Able to state October 2021. Unable to state the exact date or the day of the week. Stated that the current president is Que Samano (current is Francis Beard). Stated the current governor is Jaelyn (current is Erica Rutherford). Identifying most fingers correctly (difficulty with ring finger).   Lozada reflexes present. Hyperreflexia noted in biceps bilaterally. Areflexia in the LE bilaterally.    Rest of exam as per consult note and pending attending attestation.     At this time recommending:  [] MRI w/ and w/o contrast to investigate for any meningeal enhancement/inflammation  [] repeat LP with studies looking for glucose, protein, cell count, PCR, gram stain, fungal culture, AFB (tuberculosis), cryptococcal, CSF ACE, flow cytometry, cytology, chloride, VDRL, oligoclonal bands, lactic acid, LDH, EBV, CMV, IgG index. Would highly encourage to obtain at least 25cc of CSF as AFB test is necessary.   [] appreciate medicine, ID, pulmonology, nephrology input    Rest of recommendations will come as per attending attestation in consult note. Patient seen and examined at the bedside this AM.  Continues to have tremors.  Following commands mostly but occasionally needs repeated prompting to properly perform and responding mostly appropriately with mild-mod psychomotor slowing.   Able to state October 2021. Unable to state the exact date or the day of the week. Stated that the current president is Que Samano (current is Francis Beard). Stated the current governor is Jaelyn (current is Erica Rutherford). Identifying most fingers correctly (difficulty with ring finger).   Lozada reflexes present. Hyperreflexia noted in biceps bilaterally. Areflexia in the LE bilaterally.    Rest of exam as per consult note and pending attending attestation.     At this time recommending:  [] MRI w/ and w/o contrast to investigate for any meningeal enhancement/inflammation  [] repeat LP with studies looking for glucose, protein, cell count, PCR, gram stain, fungal culture, AFB (tuberculosis), cryptococcal, CSF ACE, flow cytometry, cytology, chloride, VDRL, oligoclonal bands, lactic acid, LDH, EBV, CMV, IgG index. Would highly encourage to obtain at least 25cc of CSF as AFB test is necessary.   [] appreciate medicine, ID, pulmonology, nephrology input    Rest of recommendations will come as per attending attestation in consult note.      NEUROLOGY ATTENDING ADDENDUM    Please also order MR c-spine wo/w contrast (see Attending Addendum to yesterday's consult note).    Also please order (unless already done so):    ESR, CRP, methylmalonic acid, homocysteine, copper, zinc, SPEP.

## 2021-10-22 NOTE — PROGRESS NOTE ADULT - PROBLEM SELECTOR PLAN 2
pt persistently febrile   F/u blood cultures, NGTD   UA neg on admission, COVID neg  With AMS, concern for meningitis/encephalitis  on empirically started vancomycin and CTX  ID f/u appreciated , vanco dced as probably not helping, , procalcitonin mildly elevated, f/u quant gold   s/p IR guided LP, results noted, glucose 14, protein >200 in CSF    - will f/u culture, PCR panel, HSV PCR, Enterovirus PCR, [place] IgG/IgM   -CT abdomen and pelvis with IV contrast unremarkablr  , US of RT arm   CPK level wnl  and treponemal antibody negative   -Pulmonary eval appreciated, findings in CXR consistent with sarcoidosis in the upper lobes, stable from previous imaging in 2016, Given LP findings (lymphocytic predominance, high protein, and negative cultures), 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 pt persistently febrile   F/u blood cultures, NGTD   UA neg on admission, COVID neg  With AMS, concern for meningitis/encephalitis   empirically started on  vancomycin and CTX  ID f/u appreciated , vanco dced as probably not helping, , procalcitonin mildly elevated, f/u quant gold   s/p IR guided LP, results noted, glucose 14, protein >200 in CSF    - will f/u culture, PCR panel, HSV PCR, Enterovirus PCR, [place] IgG/IgM   -CT abdomen and pelvis with IV contrast unremarkable  , US of RT arm   CPK level wnl  and treponemal antibody negative   -Pulmonary eval appreciated, findings in CXR consistent with sarcoidosis in the upper lobes, stable from previous imaging in 2016, Given LP findings (lymphocytic predominance, high protein, and negative cultures), 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,  Pulmonary rec brain MRI to r/o leptomeningeal enhancement, old off on steroids prior to definitely ruling out infectious etiology, Follow up ACE level  if  MRI findings do not provide alternate dx may benefit from EBUS for tissue dx of sarcoid which would increase neurosarcoid likelihood , Rheumatology eval requested    Neurology eval also appreciated, case discussed , recs noted,   f/u workup for chronic meningitis: serology for Lyme, Leptospirosis, Brucella, Blastomyces, plus Histoplasma urine antigen, Quantiferon and beta-D glucan, galactomannan , CSF [place] serology and PCR   pt may need repeat LP, if current w/u not diagnostic

## 2021-10-22 NOTE — PROGRESS NOTE ADULT - PROBLEM SELECTOR PLAN 6
RUE forearm swelling noted, no erythema, no IV   VA Duplex to r/o SVT/DVT ordered RUE forearm swelling noted, no erythema, no IV   VA Duplex negative for DVT
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
## 2021-10-22 NOTE — CONSULT NOTE ADULT - CONSULT REASON
Hyponatremia
Abnormal CT Chest
Rule out mengitis
management of possible meningitis
Suspicion of neurosarcoid

## 2021-10-22 NOTE — PROGRESS NOTE ADULT - SUBJECTIVE AND OBJECTIVE BOX
Follow Up:  FUO    Interval History/ROS: Febrile still. Has the same back soreness which comes and goes but otherwise is fine,     Allergies  No Known Allergies        ANTIMICROBIALS:      OTHER MEDS:  acetaminophen   Tablet .. 650 milliGRAM(s) Oral every 6 hours PRN  aluminum hydroxide/magnesium hydroxide/simethicone Suspension 30 milliLiter(s) Oral every 4 hours PRN  dextrose 40% Gel 15 Gram(s) Oral once  dextrose 5%. 1000 milliLiter(s) IV Continuous <Continuous>  dextrose 5%. 1000 milliLiter(s) IV Continuous <Continuous>  dextrose 50% Injectable 25 Gram(s) IV Push once  dextrose 50% Injectable 12.5 Gram(s) IV Push once  dextrose 50% Injectable 25 Gram(s) IV Push once  folic acid 1 milliGRAM(s) Oral daily  glucagon  Injectable 1 milliGRAM(s) IntraMuscular once  insulin lispro (ADMELOG) corrective regimen sliding scale   SubCutaneous three times a day before meals  lactobacillus acidophilus 1 Tablet(s) Oral two times a day with meals  lidocaine   4% Patch 1 Patch Transdermal every 24 hours  lisinopril 5 milliGRAM(s) Oral daily  LORazepam     Tablet 2 milliGRAM(s) Oral every 2 hours PRN  LORazepam   Injectable 2 milliGRAM(s) IV Push every 1 hour PRN  LORazepam   Injectable 1 milliGRAM(s) IV Push once PRN  melatonin 3 milliGRAM(s) Oral at bedtime PRN  multivitamin 1 Tablet(s) Oral daily  ondansetron Injectable 4 milliGRAM(s) IV Push every 8 hours PRN  potassium phosphate / sodium phosphate Powder (PHOS-NaK) 1 Packet(s) Oral three times a day  sodium chloride 1 Gram(s) Oral three times a day  Sodium chloride  2% 60 mL/Hr,Sodium Chloride 2% 500 milliLiter(s) 500 milliLiter(s) IV Continuous <Continuous>  tamsulosin 0.4 milliGRAM(s) Oral at bedtime      Vital Signs Last 24 Hrs  T(C): 36.3 (22 Oct 2021 11:53), Max: 39 (21 Oct 2021 15:18)  T(F): 97.4 (22 Oct 2021 11:53), Max: 102.2 (21 Oct 2021 15:18)  HR: 90 (22 Oct 2021 11:53) (90 - 114)  BP: 144/95 (22 Oct 2021 11:53) (144/95 - 152/88)  BP(mean): --  RR: 17 (22 Oct 2021 11:53) (17 - 18)  SpO2: 100% (22 Oct 2021 11:53) (96% - 100%)    Physical Exam:  General: awake, alert, non toxic. cachectic   Head: atraumatic, normocephalic  Eye: normal sclera and conjunctiva  ENT: no oropharyngeal lesions, no cervical lymphadenopathy   Cardio: regular rate and rhythm   Respiratory: nonlabored on room air, clear bilaterally, no wheezing  abd: soft, bowel sounds present, no tenderness  : ridley  Musculoskeletal: no focal joint swelling, no edema  Skin: no rash  Neurologic: tremor to hands and jaw  psych: somewhat flat affect                          14.0   5.66  )-----------( 268      ( 22 Oct 2021 07:44 )             40.0       10-22    127<L>  |  89<L>  |  20  ----------------------------<  103<H>  3.5   |  25  |  0.63    Ca    8.8      22 Oct 2021 07:44  Phos  2.3     10-22  Mg     1.90     10-22    TPro  6.8  /  Alb  3.3  /  TBili  0.6  /  DBili  x   /  AST  22  /  ALT  9   /  AlkPhos  96  10-21          MICROBIOLOGY:  Culture - Blood (collected 10-21-21 @ 10:05)  Source: .Blood Blood-Venous  Preliminary Report (10-22-21 @ 11:02):    No growth to date.    Culture - Blood (collected 10-21-21 @ 10:05)  Source: .Blood Blood-Peripheral  Preliminary Report (10-22-21 @ 11:02):    No growth to date.    Culture - Acid Fast - CSF (collected 10-20-21 @ 19:11)  Source: .CSF CSF    Culture - CSF with Gram Stain (collected 10-20-21 @ 19:11)  Source: .CSF CSF  Gram Stain (10-20-21 @ 19:42):    polymorphonuclear leukocytes seen    No organisms seen    by cytocentrifuge  Preliminary Report (10-21-21 @ 15:55):    No growth to date.    Culture - Blood (collected 10-18-21 @ 12:05)  Source: .Blood Blood-Venous  Preliminary Report (10-19-21 @ 13:02):    No growth to date.    Culture - Blood (collected 10-18-21 @ 12:04)  Source: .Blood Blood-Peripheral  Preliminary Report (10-19-21 @ 13:02):    No growth to date.    Culture - Blood (collected 10-16-21 @ 17:17)  Source: .Blood Blood-Peripheral  Final Report (10-21-21 @ 22:00):    No Growth Final    Culture - Blood (collected 10-16-21 @ 07:10)  Source: .Blood Blood-Peripheral  Final Report (10-21-21 @ 11:00):    No Growth Final    HSV 1/2 PCR: NotDete (10-21 @ 01:36)    RADIOLOGY:  Images below reviewed personally  CT Abdomen and Pelvis w/ IV Cont (10.20.21 @ 21:32)   No acute intra-abdominal or pelvic pathology.    CT Chest No Cont (10.15.21 @ 16:15)   1.  Lung findings are compatible with spectrum of smoking-related lung disease including findings of pulmonary Langerhans' cell histiocytosis and respiratory bronchiolitis. An acute infectious process would have to be excluded on clinical grounds.  2.  Pulmonary nodules measuring up to 0.8 cm for which repeat chest CT scan is recommended in 3 months for reassessment  3.  Extraparenchymal density in the left thorax can be reassessed on the above recommended follow-up  4.  Multinodular thyroid which may be further characterized with ultrasonography.    CT Head No Cont (10.15.21 @ 05:03)   No CT evidence of acute intracranial hemorrhage, mass effect, or midline shift. If clinical symptoms persist or worsen, more sensitive evaluation with brain MRI may be obtained, if no contraindications exist.

## 2021-10-22 NOTE — PROGRESS NOTE ADULT - PROBLEM SELECTOR PLAN 4
Na 129 today   Likely hypovolemic hyponatremia, in addition to D5 from lots of infusions being given with D5: Vanc, ceftiaxone, thiamine, ampicillin, acyclovir (he gets 650ml D5W daily)  Nephrology consulted, recommend Minimize D5W.  Start on oral salt tabs 1g PO TID. c/w Fluid restriction <1L per day.    will continue to monitor Na 127 today   Likely hypovolemic hyponatremia, in addition to D5 from lots of infusions being given with D5: Vanc, ceftiaxone, thiamine, ampicillin, acyclovir (he gets 650ml D5W daily)  Nephrology consulted, recommend Minimize D5W.  Start on oral salt tabs 1g PO TID. c/w Fluid restriction <1L per day.    will continue to monitor
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
## 2021-10-22 NOTE — PROGRESS NOTE ADULT - ATTENDING COMMENTS
61 M hx poss etoh abuse and concerning neurodegenerative findings w/ abnormal CT chest. Ct not c/w LCH or other cystic lung disease. But may be c/w sarcoidosis. Pt does not relay any respiratory sx or systemic sx of sarcoid. His CSF profile can be c/w neurosarcoidosis - aseptic meningitis. Given his temporal wasting and BMI would also be concerned for occult malignancy and paraneoplastic syndromes.    - check MRI contrast of brain  - f/u Neuro consult  - consider malignancy w/u and can consider paraneoplastic panel and cytology from CSF fluid  - check autoimmune w/u as well  - f/u quantiferon, f/u AFB csf (pt denies any specific risk factors)  - if MRI findings do not provide alternate dx may benefit from EBUS for tissue dx of sarcoid which would increase neurosarcoid likelihood .
Pt. with hyponatremia. SNa improved to 129 today. Plan as outlined above. Monitor SNa.
Pt. with hyponatremia. SNa low at 122 today. Plan as outlined above. Monitor SNa closely.

## 2021-10-22 NOTE — PROVIDER CONTACT NOTE (OTHER) - SITUATION
patient ordered for additional labwork, pt confused at baseline and refusing accusing staff of doing "duplicate" bloodwork. labs ordered for antibody testing
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
## 2021-10-23 NOTE — PROVIDER CONTACT NOTE (OTHER) - RECOMMENDATIONS
PA made aware
Administer tylenol.
Repeat blood culture
Tylenol IV
Administer tylenol.
Bcx? PO tylenol?
IV Tylenol.
Administer Tylenol. Recheck temperature in 30 minutes post administering Tylenol. Continue to monitor
Administer tylenol.
Temperature improving from earlier. Order ice packs and place ice packs at neck groin and axillary regions. Continue to monitor
Administer tylenol.
Administer tylenol, provide ice packs.

## 2021-10-23 NOTE — PROVIDER CONTACT NOTE (OTHER) - ASSESSMENT
Vital as noted. No signs or symptoms. Pt resting comfortably in bed
Awake, oriented to self, hot to touch, pt shivering
Patient warm with chills during AM assessment.
Patient is warm to touch, with chills.
Pt shaking. Rectal temperature obtained. No other signs or symptoms. Pt resting comfortably in bed
Pt shivering and feels warm to the touch.
patient A&Ox1-2, agitated at times and adamantly refusing labs at this time
Pt shivering and feels warm to the touch.
Fever 101.8.
Patient a&0x1/2- confused, refusing to answer question. Notably uncomfortable.
Patient is hot to touch
Pt shivering and feels warm to the touch.
Pt shivering and feels warm to the touch.
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
## 2021-10-23 NOTE — PROGRESS NOTE ADULT - PROBLEM SELECTOR PROBLEM 1
Hyponatremia
Toxic metabolic encephalopathy

## 2021-10-23 NOTE — PROGRESS NOTE ADULT - PROBLEM SELECTOR PLAN 4
Na 135 today   Likely hypovolemic hyponatremia, in addition to D5 from lots of infusions being given with D5: Vanc, ceftiaxone, thiamine, ampicillin, acyclovir (he gets 650ml D5W daily)  Nephrology consulted, recommend Minimize D5W.  Start on oral salt tabs 1g PO TID. c/w Fluid restriction <1L per day.    will continue to monitor
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
## 2021-10-23 NOTE — PROGRESS NOTE ADULT - PROBLEM SELECTOR PLAN 3
C/w CIWA- scores remain 4 for tremors, but patient clinically does not appear to be withdrawing  C/w symptom-triggered Ativan  C/w IV thiamine, folic acid, MV
C/w CIWA- scores remain ~6, patient calm  C/w symptom-triggered Ativan  C/w IV thiamine, folic acid, MV
C/w CIWA- scores remain 4 for tremors, but patient clinically does not appear to be withdrawing  C/w symptom-triggered Ativan  C/w IV thiamine, folic acid, MV

## 2021-10-23 NOTE — PROVIDER CONTACT NOTE (OTHER) - ACTION/TREATMENT ORDERED:
Provider aware. Pt has been febrile daily. Administer Tylenol. Recheck temperature in 30 minutes post administering Tylenol. Continue to monitor

## 2021-10-23 NOTE — PROVIDER CONTACT NOTE (OTHER) - ACTION/TREATMENT ORDERED:
Provider aware. Temperature improving from earlier. Ice packs ordered. Place ice packs at neck groin and axillary regions. No additional interventions needed at this time. Continue to monitor

## 2021-10-23 NOTE — PROVIDER CONTACT NOTE (OTHER) - BACKGROUND
Admitted with altered mental status. Pt has been running temps throughout hospitalization.
PT admitted for altered mental status
Pt admitted for altered mental status, [facility] protocol, awaiting lumbar puncture for r/o meningitis.
PT admitted for altered mental status
Admitted with altered mental status, past medical history of diabetes.
Admitted with altered mental status. Pt has been running temps throughout hospitalization.
Patient admitted with AMS from unclear etiology.

## 2021-10-23 NOTE — PROGRESS NOTE ADULT - PROVIDER SPECIALTY LIST ADULT
Infectious Disease
Pulmonology
Infectious Disease
Infectious Disease
Nephrology
Hospitalist

## 2021-10-23 NOTE — PROGRESS NOTE ADULT - NUTRITIONAL ASSESSMENT
This patient has been assessed with a concern for Malnutrition and has been determined to have a diagnosis/diagnoses of Severe protein-calorie malnutrition and Underweight (BMI < 19).    This patient is being managed with:   Diet Soft-  Consistent Carbohydrate {Evening Snack} (CSTCHOSN)  Supplement Feeding Modality:  Oral  Glucerna Shake Cans or Servings Per Day:  1       Frequency:  Three Times a day  Entered: Oct 19 2021  8:27AM
This patient has been assessed with a concern for Malnutrition and has been determined to have a diagnosis/diagnoses of Severe protein-calorie malnutrition and Underweight (BMI < 19).    This patient is being managed with:   Diet NPO after Midnight-     NPO Start Date: 21-Oct-2021   NPO Start Time: 23:59  Except Medications  Entered: Oct 21 2021  9:48PM    Diet Soft-  Consistent Carbohydrate {Evening Snack} (CSTCHOSN)  Supplement Feeding Modality:  Oral  Glucerna Shake Cans or Servings Per Day:  1       Frequency:  Three Times a day  Entered: Oct 19 2021  8:27AM    
This patient has been assessed with a concern for Malnutrition and has been determined to have a diagnosis/diagnoses of Severe protein-calorie malnutrition and Underweight (BMI < 19).    This patient is being managed with:   Diet Soft-  Consistent Carbohydrate {Evening Snack} (CSTCHOSN)  Supplement Feeding Modality:  Oral  Glucerna Shake Cans or Servings Per Day:  1       Frequency:  Three Times a day  Entered: Oct 19 2021  8:27AM    Diet NPO after Midnight-     NPO Start Date: 18-Oct-2021   NPO Start Time: 23:59  Except Medications  Entered: Oct 19 2021  5:32AM    
This patient has been assessed with a concern for Malnutrition and has been determined to have a diagnosis/diagnoses of Severe protein-calorie malnutrition and Underweight (BMI < 19).    This patient is being managed with:   Diet Soft-  Consistent Carbohydrate {Evening Snack} (CSTCHOSN)  Supplement Feeding Modality:  Oral  Glucerna Shake Cans or Servings Per Day:  1       Frequency:  Three Times a day  Entered: Oct 19 2021  8:27AM    Diet NPO after Midnight-     NPO Start Date: 18-Oct-2021   NPO Start Time: 23:59  Except Medications  Entered: Oct 19 2021  5:32AM    
This patient has been assessed with a concern for Malnutrition and has been determined to have a diagnosis/diagnoses of Severe protein-calorie malnutrition and Underweight (BMI < 19).    This patient is being managed with:   Diet Dysphagia 3 Soft-Thin Liquids-  Consistent Carbohydrate {No Snacks} (CSTCHO)  DASH/TLC {Sodium & Cholesterol Restricted} (DASH)  Supplement Feeding Modality:  Oral  Ensure Enlive Servings Per Day:  1       Frequency:  Two Times a day  Entered: Oct 17 2021  1:55PM    
This patient has been assessed with a concern for Malnutrition and has been determined to have a diagnosis/diagnoses of Severe protein-calorie malnutrition and Underweight (BMI < 19).    This patient is being managed with:   Diet Soft-  Consistent Carbohydrate {Evening Snack} (CSTCHOSN)  Supplement Feeding Modality:  Oral  Glucerna Shake Cans or Servings Per Day:  1       Frequency:  Three Times a day  Entered: Oct 19 2021  8:27AM
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
## 2021-10-23 NOTE — PROGRESS NOTE ADULT - PROBLEM SELECTOR PLAN 10
DVT ppx: Lovenox  Diet: DASH, consistent carb with Ensure, nutrition eval  Dispo: PT eval, will need to coordinate with pt's family regarding safe discharge plan once medically stable
DVT ppx: Lovenox  Diet: DASH, consistent carb with Ensure, nutrition eval  Dispo: PT eval, will need to coordinate with pt's family regarding safe discharge plan
DVT ppx: Lovenox  Diet: DASH, consistent carb with Ensure, nutrition eval  Dispo: PT eval, will need to coordinate with pt's family regarding safe discharge plan
DVT ppx: Lovenox  Diet: DASH, consistent carb with Ensure, nutrition eval  urine retention; pt on flomax, Foely cath +, will try TOV with clinical improvement   Dispo: PT eval, will need to coordinate with pt's family regarding safe discharge plan once medically stable  plan of care was d/w patient and brother in law at bedside
DVT ppx: Lovenox  Diet: DASH, consistent carb with Ensure, nutrition eval  urine retention; pt on flomax, Foely cath +, will try TOV with clinical improvement   Dispo: PT eval, will need to coordinate with pt's family regarding safe discharge plan once medically stable
DVT ppx: Lovenox  Diet: DASH, consistent carb  Dispo: PT eval, will need to coordinate with pt's family regarding safe discharge plan
DVT ppx: Lovenox  Diet: DASH, consistent carb with Ensure, nutrition eval  urine retention; pt on flomax, Foely cath +, will try TOV with clinical improvement   Dispo: PT eval, will need to coordinate with pt's family regarding safe discharge plan once medically stable
DVT ppx: Lovenox  Diet: DASH, consistent carb with Ensure, nutrition eval  Dispo: PT eval, will need to coordinate with pt's family regarding safe discharge plan

## 2021-10-23 NOTE — PROGRESS NOTE ADULT - PROBLEM SELECTOR PLAN 2
pt persistently febrile   F/u blood cultures, NGTD   UA neg on admission, COVID neg  With AMS, concern for meningitis/encephalitis   empirically started on  vancomycin and CTX  ID f/u appreciated , vanco dced as probably not helping, , procalcitonin mildly elevated, f/u quant gold   s/p IR guided LP, results noted, glucose 14, protein >200 in CSF    - will f/u culture, PCR panel, HSV PCR, Enterovirus PCR, [place] IgG/IgM   -CT abdomen and pelvis with IV contrast unremarkable  , US of RT arm   CPK level wnl  and treponemal antibody negative   -Pulmonary eval appreciated, findings in CXR consistent with sarcoidosis in the upper lobes, stable from previous imaging in 2016, Given LP findings (lymphocytic predominance, high protein, and negative cultures), 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,  Pulmonary rec brain MRI to r/o leptomeningeal enhancement, old off on steroids prior to definitely ruling out infectious etiology, Follow up ACE level  if  MRI findings do not provide alternate dx may benefit from EBUS for tissue dx of sarcoid which would increase neurosarcoid likelihood , Rheumatology eval requested    Neurology eval also appreciated, case discussed , recs noted,   f/u workup for chronic meningitis: serology for Lyme, Leptospirosis, Brucella, Blastomyces, plus Histoplasma urine antigen, Quantiferon and beta-D glucan, galactomannan , CSF [place] serology and PCR   pt may need repeat LP, if current w/u not diagnostic

## 2021-10-24 NOTE — PROVIDER CONTACT NOTE (OTHER) - NAME OF MD/NP/PA/DO NOTIFIED:
troy gustafson acp
ACP: Sherie Lara
Milady Mckeon, NP
Mackenzie Scott
YAYO Lara
ACP: Mackenzie Scott
Juana Concepcion  ([facility] [id number])
Marco Maher
Joy Garduno, PA
ACP: Mackenzie Scott
Juana Concepcion
Juana Concepcion
Nika Lara
QAMAR Batres

## 2021-10-24 NOTE — PROVIDER CONTACT NOTE (OTHER) - DATE AND TIME:
16-Oct-2021 00:10
21-Oct-2021 06:15
22-Oct-2021 06:30
23-Oct-2021 11:50
24-Oct-2021 06:40
18-Oct-2021 06:03
22-Oct-2021 16:36
23-Oct-2021 10:31
20-Oct-2021 10:04
21-Oct-2021 15:40
17-Oct-2021 14:30
19-Oct-2021 07:48
21-Oct-2021 21:45
22-Oct-2021 22:30
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
## 2021-10-24 NOTE — CHART NOTE - NSCHARTNOTEFT_GEN_A_CORE
Code blue was called by RN at 4:25AM when ready to perform AM care, compressions started immediately by staff and RRT team, code ran for about 25 minute's with rhythm persistently in asystole.  Patient was pronounced at 4:42AM, provide spoke to Haresh Parker , brother ([phone]), autopsy offer but brother has to speak to other family members in order to make final decision.

## 2021-10-24 NOTE — CHART NOTE - NSCHARTNOTEFT_GEN_A_CORE
Code blue called at 4:25AM. Compressions were started and epinephrine was given per ACLS protocol, IO was placed in R tibia for better access. Initial rhythm appeared to be asystole. Patient was intubated by anesthesia. Overall 8 rounds of CPR and 6 epis were given with asystole seen on every pulse check. FS was 43 and 1 amp dextrose was given, additionally patient received 1 amp bicarb and 1 g calcium. After 17 minutes of coding patient with rhythm persistently in asystole, patient was pronounced at 4:42AM, primary team bedside and patient's family notified.     Josiane Lynch MD, PGY-3  Internal Medicine  [facility] #[id number]

## 2021-10-25 LAB
DSDNA AB SER-ACNC: <12 IU/ML — SIGNIFICANT CHANGE UP
GALACTOMANNAN AG SERPL-ACNC: 0.04 INDEX — SIGNIFICANT CHANGE UP (ref 0–0.49)
LEPTOSPIRA AB TITR SER: NEGATIVE — SIGNIFICANT CHANGE UP
VDRL CSF-TITR: SIGNIFICANT CHANGE UP
WNV IGG CSF IA-ACNC: POSITIVE
WNV IGM CSF IA-ACNC: NEGATIVE — SIGNIFICANT CHANGE UP

## 2021-10-26 LAB
B ABORTUS IGG SER-ACNC: NEGATIVE — SIGNIFICANT CHANGE UP
B DERMAT AB FLD QL: NEGATIVE — SIGNIFICANT CHANGE UP
BRUCELLA IGM SER-ACNC: NEGATIVE — SIGNIFICANT CHANGE UP
CULTURE RESULTS: SIGNIFICANT CHANGE UP
CULTURE RESULTS: SIGNIFICANT CHANGE UP
GAMMA INTERFERON BACKGROUND BLD IA-ACNC: 0.36 IU/ML — SIGNIFICANT CHANGE UP
HISTONE AB SER-ACNC: 0.3 UNITS — SIGNIFICANT CHANGE UP (ref 0–0.9)
M TB IFN-G BLD-IMP: POSITIVE
M TB IFN-G CD4+ BCKGRND COR BLD-ACNC: 0.81 IU/ML — SIGNIFICANT CHANGE UP
M TB IFN-G CD4+CD8+ BCKGRND COR BLD-ACNC: 2.33 IU/ML — SIGNIFICANT CHANGE UP
QUANT TB PLUS MITOGEN MINUS NIL: 0.11 IU/ML — SIGNIFICANT CHANGE UP
SPECIMEN SOURCE: SIGNIFICANT CHANGE UP
SPECIMEN SOURCE: SIGNIFICANT CHANGE UP

## 2021-10-27 LAB
ANA TITR SER: NEGATIVE — SIGNIFICANT CHANGE UP
MPO AB + PR3 PNL SER: SIGNIFICANT CHANGE UP
SMOOTH MUSCLE AB SER-ACNC: SIGNIFICANT CHANGE UP

## 2021-10-28 LAB
AUTO DIFF PNL BLD: NEGATIVE — SIGNIFICANT CHANGE UP
C-ANCA SER-ACNC: NEGATIVE — SIGNIFICANT CHANGE UP
P-ANCA SER-ACNC: NEGATIVE — SIGNIFICANT CHANGE UP

## 2021-12-17 LAB
-  ETHAMBUTOL: SIGNIFICANT CHANGE UP
-  ETHIONAMIDE: SIGNIFICANT CHANGE UP
-  FLUOROQUINOLONES: SIGNIFICANT CHANGE UP
-  ISONIAZID: SIGNIFICANT CHANGE UP
-  KANAMYCIN/AMIKACIN: SIGNIFICANT CHANGE UP
-  KANAMYCIN: SIGNIFICANT CHANGE UP
-  PYRAZINAMIDE: SIGNIFICANT CHANGE UP
-  RIFAMPIN: SIGNIFICANT CHANGE UP
-  STREPTOMYCIN: SIGNIFICANT CHANGE UP
METHOD TYPE: SIGNIFICANT CHANGE UP

## 2021-12-28 LAB
-  CAPREOMYCIN (10.0 UG/ML): SIGNIFICANT CHANGE UP
-  CYCLOSERINE (30.0 UG/ML): SIGNIFICANT CHANGE UP
-  ETHAMBUTOL (5.0 MCG/ML): SIGNIFICANT CHANGE UP
-  ETHIONAMIDE (5.0 UG/ML): SIGNIFICANT CHANGE UP
-  ISONIAZID (0.2 UG/ML): SIGNIFICANT CHANGE UP
-  ISONIAZID (1.0 UG/ML): SIGNIFICANT CHANGE UP
-  RIFABUTIN (0.5 UG/ML): SIGNIFICANT CHANGE UP
-  RIFABUTIN (2.0 UG/ML): SIGNIFICANT CHANGE UP
-  RIFAMPIN (1.0 UG/ML): SIGNIFICANT CHANGE UP
-  STREPTOMYCIN (10.0  UG/ML): SIGNIFICANT CHANGE UP
-  STREPTOMYCIN (2.0 UG/ML): SIGNIFICANT CHANGE UP
CULTURE RESULTS: SIGNIFICANT CHANGE UP
Lab: SIGNIFICANT CHANGE UP
METHOD TYPE: SIGNIFICANT CHANGE UP
ORGANISM # SPEC MICROSCOPIC CNT: SIGNIFICANT CHANGE UP
SPECIMEN SOURCE: SIGNIFICANT CHANGE UP

## 2021-12-30 NOTE — ED PROVIDER NOTE - NS_EDPROVIDERDISPOUSERTYPE_ED_A_ED
Attending Attestation (For Attendings USE Only)... Cheiloplasty (Less Than 50%) Text: A decision was made to reconstruct the defect with a  cheiloplasty.  The defect was undermined extensively.  Additional obicularis oris muscle was excised with a 15 blade scalpel.  The defect was converted into a full thickness wedge, of less than 50% of the vertical height of the lip, to facilite a better cosmetic result.  Small vessels were then tied off with 5-0 monocyrl. The obicularis oris, superficial fascia, adipose and dermis were then reapproximated.  After the deeper layers were approximated the epidermis was reapproximated with particular care given to realign the vermilion border.

## 2022-01-01 NOTE — PROGRESS NOTE ADULT - PROBLEM SELECTOR PLAN 6
Left message for mom to call back. Please schedule  visit as requested by . See below.   RUE forearm swelling noted, no erythema, no IV   VA Duplex negative for DVT

## 2022-01-22 LAB — -  PYRAZINAMIDE (100.0 UG/ML): SIGNIFICANT CHANGE UP

## 2022-07-27 NOTE — PHYSICAL THERAPY INITIAL EVALUATION ADULT - PLANNED THERAPY INTERVENTIONS, PT EVAL
bed mobility training/gait training/strengthening/transfer training [place] Counseling:  Patient advised that medication is irritating and drying.  Patient may need to apply sparingly and wash off after an hour before eventually leaving it on overnight.  The patient verbalized understanding of the proper use and possible adverse effects of tazorac.  All of the patient's questions and concerns were addressed.

## 2022-12-02 NOTE — PHYSICAL THERAPY INITIAL EVALUATION ADULT - RANGE OF MOTION EXAMINATION, REHAB EVAL
bilateral upper extremity ROM was WFL (within functional limits)/bilateral lower extremity ROM was WFL (within functional limits)
Home

## 2023-08-21 NOTE — H&P ADULT - PROBLEM/PLAN-4
We would like to see you back in the clinic in 6 months.  You will be able to schedule this appointment by calling or by using your [facility] portal 3 months before this time. Because our schedule fills so quickly, please set a reminder in your phone or on your calendar to schedule 3 months before you are due to come in so that we can see you in a timely fashion.  You should also receive a reminder from us in the mail. This will help us ensure we can continue to provide excellent healthcare for you. Thank you.    
DISPLAY PLAN FREE TEXT

## 2024-10-19 NOTE — PROVIDER CONTACT NOTE (MEDICATION) - SITUATION
CRS  Pt sitting in a chair. +flatus  Flowsheet reviewed  Abd: soft, nt    Dressing intact    Drains: thin    NGT: clear gastric, non-bilious    Plan:  Check labs in am. NGT clamping trial tomorrow if NG remains gastric in appearance   pt c/o lower back pain, unrelieved by tylenol
